# Patient Record
Sex: FEMALE | Race: WHITE | Employment: UNEMPLOYED | URBAN - METROPOLITAN AREA
[De-identification: names, ages, dates, MRNs, and addresses within clinical notes are randomized per-mention and may not be internally consistent; named-entity substitution may affect disease eponyms.]

---

## 2022-10-13 ENCOUNTER — OFFICE VISIT (OUTPATIENT)
Dept: GYNECOLOGY | Age: 52
End: 2022-10-13

## 2022-10-13 VITALS
SYSTOLIC BLOOD PRESSURE: 107 MMHG | DIASTOLIC BLOOD PRESSURE: 70 MMHG | WEIGHT: 258 LBS | BODY MASS INDEX: 41.46 KG/M2 | HEART RATE: 79 BPM | HEIGHT: 66 IN

## 2022-10-13 DIAGNOSIS — C54.1 ENDOMETRIAL CANCER (HCC): Primary | ICD-10-CM

## 2022-10-13 PROCEDURE — 99204 OFFICE O/P NEW MOD 45 MIN: CPT | Performed by: OBSTETRICS & GYNECOLOGY

## 2022-10-13 RX ORDER — METOPROLOL TARTRATE 100 MG/1
TABLET ORAL 2 TIMES DAILY
COMMUNITY
Start: 2022-10-12

## 2022-10-13 RX ORDER — ASPIRIN 81 MG/1
81 TABLET ORAL DAILY
COMMUNITY
End: 2022-10-27

## 2022-10-13 RX ORDER — ALBUTEROL SULFATE 0.83 MG/ML
SOLUTION RESPIRATORY (INHALATION)
COMMUNITY

## 2022-10-13 RX ORDER — ACETAMINOPHEN 500 MG
TABLET ORAL
COMMUNITY

## 2022-10-13 RX ORDER — OMEPRAZOLE 40 MG/1
CAPSULE, DELAYED RELEASE ORAL
COMMUNITY
Start: 2022-08-02

## 2022-10-13 RX ORDER — ALBUTEROL SULFATE 90 UG/1
AEROSOL, METERED RESPIRATORY (INHALATION)
COMMUNITY

## 2022-10-13 RX ORDER — ERGOCALCIFEROL 1.25 MG/1
CAPSULE ORAL
COMMUNITY
End: 2022-10-13

## 2022-10-13 NOTE — LETTER
10/13/2022 3:13 PM    Patient:  Jenny Xie   YOB: 1970  Date of Visit: 10/13/2022      Dear Flower Salinas, MD  [address] [facility] [address] [address]  Via Fax: 81 [facility], NP  [address] [facility]  [address]  [address] [address]  Via Fax: [phone]: Thank you for referring Ms. Jenny Xie to me for evaluation/treatment. Below are the relevant portions of my assessment and plan of care. New patient referred by Korina Sanchez for endometrial cancer. [address] [facility], [address]  P [phone]  F [phone]    Office Note  Patient ID:  Name:  Jenny Xie  MRN:  [id number]  :  1970/52 y.o. Date:  10/13/2022      HISTORY OF PRESENT ILLNESS:  Jenny Xie is a 45 y.o. morbidly obese  postmenopausal female who is a new patient being seen for endometrial cancer. She is referred by Dr. Flower Salinas with [facility] She had presented for evaluation of postmenopausal bleeding. A pelvic ultrasound revealed a thickened endometrium. Dr. Josesito Chung performed a hysteroscopy/D&C. Pathologic Diagnosis     A. Endocervix, curettings:                             Endocervical glandular mucosa with focal acute inflammation and metaplastic changes. Limited squamous ectocervix is noted with mild acute inflammation. B.          Endometrium, curettings:                             Endometrial endometrioid adenocarcinoma, FIGO grade 1 of 3, with mucinous features (see note). Note:    The case was further reviewed at [facility], please see their report and comment. Immunostains performed at [facility] show the tumor to be positive for estrogen receptors, positive for progesterone receptors: patchy staining with mCEA, positivity for vimentin and patchy/heterogeneous, non-block staining with p16.        Based upon this pathology, I have been asked to see her in consultation for further evaluation and management. ROS:   and GI review:  Negative  Cardiopulmonary review:  Negative   Musculoskeletal:  Negative    A comprehensive review of systems was negative except for that written in the History of Present Illness. , 10 point ROS      OB/GYN ROS/HX:  [id number]      Problem List:  There are no problems to display for this patient. PMH:  Past Medical History:   Diagnosis Date    Asthma     Hypertension       PSH:  Past Surgical History:   Procedure Laterality Date    HX BREAST LUMPECTOMY      HX DILATION AND CURETTAGE  09/26/2022    HX ORTHOPAEDIC      tumor in finger      Social History:  Social History     Tobacco Use    Smoking status: Every Day     Types: Cigarettes    Smokeless tobacco: Never   Substance Use Topics    Alcohol use: Not on file      Family History:  Family History   Problem Relation Age of Onset    Breast Cancer Mother     Lung Cancer Paternal Grandfather       Medications: (reviewed)  Current Outpatient Medications   Medication Sig    fluticasone propionate (FLONASE ALLERGY RELIEF NA) Flonase Allergy Relief    LORATADINE, BULK, loratadine (bulk)    cholecalciferol, vitamin D3, (VITAMIN D3 PO) Vitamin D    acetaminophen (TYLENOL) 500 mg tablet Take  by mouth every six (6) hours as needed.  albuterol (PROVENTIL VENTOLIN) 2.5 mg /3 mL (0.083 %) nebu albuterol sulfate 2.5 mg/3 mL (0.083 %) solution for nebulization   TAKE 3 ML AS NEEDED INHALATION EVERY 4 TO 6 HOURS 30 DAYS    albuterol (PROVENTIL HFA, VENTOLIN HFA, PROAIR HFA) 90 mcg/actuation inhaler albuterol sulfate HFA 90 mcg/actuation aerosol inhaler    aspirin delayed-release 81 mg tablet Take 81 mg by mouth daily.  omeprazole (PRILOSEC) 40 mg capsule TAKE 1 CAPSULE BY MOUTH ONCE A DAY 1/2 HOUR BEFORE BREAKFAST    metoprolol tartrate (LOPRESSOR) 100 mg IR tablet      No current facility-administered medications for this visit. Allergies: (reviewed)  Allergies   Allergen Reactions    Food [Shellfish Derived] Anaphylaxis    Doxycycline Hives and Nausea Only    Iodine Vertigo and Unknown (comments)    Mushroom Hives    Prednisone Vertigo    Penicillins Other (comments)          OBJECTIVE:    Physical Exam:  VITAL SIGNS: Vitals:    10/13/22 1437   BP: 107/70   Pulse: 79   Weight: 258 lb (117 kg)   Height: 5' 6\" (1.676 m)     Body mass index is 41.64 kg/m². GENERAL RODRIGO: Conversant, alert, oriented. No acute distress. HEENT: HEENT. No thyroid enlargement. No JVD. Neck: Supple without restrictions. RESPIRATORY: Clear to auscultation and percussion to the bases. No CVAT. CARDIOVASC: RRR without murmur/rub. GASTROINT: soft, non-tender, without masses or organomegaly   MUSCULOSKEL: no joint tenderness, deformity or swelling   EXTREMITIES: extremities normal, atraumatic, no cyanosis or edema   PELVIC: Vulva and vagina appear normal. Bimanual exam reveals normal uterus and adnexa. RECTAL: Deferred   NAVID SURVEY: No suspicious lymphadenopathy or edema noted. NEURO: Grossly intact. No acute deficit. Lab Data:    No results found for: WBC, HGB, HCT, PLT, MCV, HGBEXT, HCTEXT, PLTEXT, HGBEXT, HCTEXT, PLTEXT  No results found for: NA, K, CL, CO2, AGAP, GLU, BUN, CREA, BUCR, GFRAA, GFRNA, CA      CT of abdomen/pelvis (4/20/22) - [facility], [facility]  Heart size is normal. No pericardial effusion or pleural effusion. Visualized lung bases are clear. There is a 6 mm soft tissue nodule seen within the distal esophagus which could represent a polyp, prominent gastric folds from a small hiatal hernia or debris. The stomach and duodenum are grossly unremarkable. Given the reduced sensitivity of noncontrast images for abdominopelvic pathology, the liver, gallbladder, right adrenal gland, spleen, and pancreas are unremarkable. In the left adrenal gland, there is a 11 mm nodule measuring -8 Hounsfield is consistent with an adenoma. No hydronephrosis. No renal calculi. No ureteral or bladder calculus. Uterus and adnexa are unremarkable. Colonic diverticulosis. No evidence of bowel obstruction or inflammation. Appendix is visualized and is normal. No abdominal or pelvic lymphadenopathy. Atherosclerotic abdominal aorta which is normal in caliber. Bone windows demonstrate no focally destructive osseous lesions. IMPRESSION:   1. No hydronephrosis. No ureteral or bladder calculus. 2. A 6 mm soft tissue nodule within the distal esophagus could represent polyp, a gastric fold from small hiatal hernia or debris. This could be further evaluated with endoscopy. 3. There is an 11 mm left adrenal adenoma. Pelvic ultrasound (4/20/22) [facility]  UTERUS:  Size (cm): Length: 7.3  AP: 3.8 Width: 4.6   Myometrial Echo Pattern: In the posterior aspect of the uterus, there is a 25 x 17 x 20 mm hypoechoic lesion statistically likely uterine fibroid. Contour: Normal.     ENDOMETRIAL ECHO COMPLEX: The endometrial cavity is normally collapsed with no fluid distention. Width: 6.4 mm. RIGHT OVARY:   Size (cm): Length: 3.1  AP: 1.7  Width: 1.7 Volume: 5.1 mL. There is a 4 mm rounded hyperechoic focus which could represent a small dermoid cyst.     LEFT OVARY: Left ovary not visualized, either obscured by overlying viscera or surgically absent (to be correlated with surgical history). OVARIAN DOPPLER: Blood flow is demonstrated to right ovary. ADNEXAL MASSES: None seen. CUL-DE-SAC: No free fluid present. IMPRESSION:   1. There is a 25 mm hypoechoic lesion within the posterior fundus statistically likely small fibroid.  There is a 4 mm hyperechoic focus within the right ovary which could represent a tiny dermoid cyst.       IMPRESSION/PLAN:  Shawn Guerra is a 45 y.o. female with a diagnosis of grade 1 endometrial carcinoma, clinically stage I.  I reviewed with Shawn Guerra her medical records, physical exam, and review of symptoms. I discussed with her the standard of care for managing endometrial cancer, which would consist of total hysterectomy with pelvic lymph node evaluation. I recommended a robotic approach with sentinel pelvic lymph node dissection. She was counseled on the risks, benefits, indications and alternatives of the planned procedure. We discussed the possible surgical risks of bleeding, infection, potential injury to other organs/structures, and the risks of anesthesia. Her questions were answered to her satisfaction and she wishes to proceed as planned. I tried to reassure her that grade 1 endometrial cancers are generally cured with surgery and that additional therapy is not needed. An electronic signature was used to sign this note. Isabelle Mckeon MD  10/13/22               If you have questions, please do not hesitate to call me. I look forward to following Ms. Agudelo along with you.         Sincerely,      Isabelle Mckeon MD

## 2022-10-13 NOTE — PROGRESS NOTES
[address] [facility] [address] [facility] [phone]  F [phone]    Office Note  Patient ID:  Name:  Jess Silverio  MRN:  [id number]  :  1970/52 y.o. Date:  10/13/2022      HISTORY OF PRESENT ILLNESS:  Jess Silverio is a 45 y.o. morbidly obese  postmenopausal female who is a new patient being seen for endometrial cancer. She is referred by Dr. Geovanni Jack with [facility] in [facility]. She had presented for evaluation of postmenopausal bleeding. A pelvic ultrasound revealed a thickened endometrium. Dr. Naa Carreno performed a hysteroscopy/D&C. Pathologic Diagnosis     A. Endocervix, curettings:                             Endocervical glandular mucosa with focal acute inflammation and metaplastic changes. Limited squamous ectocervix is noted with mild acute inflammation. B.          Endometrium, curettings:                             Endometrial endometrioid adenocarcinoma, FIGO grade 1 of 3, with mucinous features (see note). Note:    The case was further reviewed at [facility], please see their report and comment. Immunostains performed at [facility] show the tumor to be positive for estrogen receptors, positive for progesterone receptors: patchy staining with mCEA, positivity for vimentin and patchy/heterogeneous, non-block staining with p16. Based upon this pathology, I have been asked to see her in consultation for further evaluation and management. ROS:   and GI review:  Negative  Cardiopulmonary review:  Negative   Musculoskeletal:  Negative    A comprehensive review of systems was negative except for that written in the History of Present Illness. , 10 point ROS      OB/GYN ROS/HX:  [id number]      Problem List:  There are no problems to display for this patient.     PMH:  Past Medical History:   Diagnosis Date    Asthma     Hypertension       PSH:  Past Surgical History:   Procedure Laterality Date    HX BREAST LUMPECTOMY      HX DILATION AND CURETTAGE  09/26/2022    HX ORTHOPAEDIC      tumor in finger      Social History:  Social History     Tobacco Use    Smoking status: Every Day     Types: Cigarettes    Smokeless tobacco: Never   Substance Use Topics    Alcohol use: Not on file      Family History:  Family History   Problem Relation Age of Onset    Breast Cancer Mother     Lung Cancer Paternal Grandfather       Medications: (reviewed)  Current Outpatient Medications   Medication Sig    fluticasone propionate (FLONASE ALLERGY RELIEF NA) Flonase Allergy Relief    LORATADINE, BULK, loratadine (bulk)    cholecalciferol, vitamin D3, (VITAMIN D3 PO) Vitamin D    acetaminophen (TYLENOL) 500 mg tablet Take  by mouth every six (6) hours as needed. albuterol (PROVENTIL VENTOLIN) 2.5 mg /3 mL (0.083 %) nebu albuterol sulfate 2.5 mg/3 mL (0.083 %) solution for nebulization   TAKE 3 ML AS NEEDED INHALATION EVERY 4 TO 6 HOURS 30 DAYS    albuterol (PROVENTIL HFA, VENTOLIN HFA, PROAIR HFA) 90 mcg/actuation inhaler albuterol sulfate HFA 90 mcg/actuation aerosol inhaler    aspirin delayed-release 81 mg tablet Take 81 mg by mouth daily. omeprazole (PRILOSEC) 40 mg capsule TAKE 1 CAPSULE BY MOUTH ONCE A DAY 1/2 HOUR BEFORE BREAKFAST    metoprolol tartrate (LOPRESSOR) 100 mg IR tablet      No current facility-administered medications for this visit. Allergies: (reviewed)  Allergies   Allergen Reactions    Food [Shellfish Derived] Anaphylaxis    Doxycycline Hives and Nausea Only    Iodine Vertigo and Unknown (comments)    Mushroom Hives    Prednisone Vertigo    Penicillins Other (comments)          OBJECTIVE:    Physical Exam:  VITAL SIGNS: Vitals:    10/13/22 1437   BP: 107/70   Pulse: 79   Weight: 258 lb (117 kg)   Height: 5' 6\" (1.676 m)     Body mass index is 41.64 kg/m². GENERAL RODRIGO: Conversant, alert, oriented. No acute distress. HEENT: HEENT. No thyroid enlargement.  No JVD.   Neck: Supple without restrictions. RESPIRATORY: Clear to auscultation and percussion to the bases. No CVAT. CARDIOVASC: RRR without murmur/rub. GASTROINT: soft, non-tender, without masses or organomegaly   MUSCULOSKEL: no joint tenderness, deformity or swelling   EXTREMITIES: extremities normal, atraumatic, no cyanosis or edema   PELVIC: Vulva and vagina appear normal. Bimanual exam reveals normal uterus and adnexa. RECTAL: Deferred   NAVID SURVEY: No suspicious lymphadenopathy or edema noted. NEURO: Grossly intact. No acute deficit. Lab Data:    No results found for: WBC, HGB, HCT, PLT, MCV, HGBEXT, HCTEXT, PLTEXT, HGBEXT, HCTEXT, PLTEXT  No results found for: NA, K, CL, CO2, AGAP, GLU, BUN, CREA, BUCR, GFRAA, GFRNA, CA      CT of abdomen/pelvis (4/20/22) - [facility], [facility]  Heart size is normal. No pericardial effusion or pleural effusion. Visualized lung bases are clear. There is a 6 mm soft tissue nodule seen within the distal esophagus which could represent a polyp, prominent gastric folds from a small hiatal hernia or debris. The stomach and duodenum are grossly unremarkable. Given the reduced sensitivity of noncontrast images for abdominopelvic pathology, the liver, gallbladder, right adrenal gland, spleen, and pancreas are unremarkable. In the left adrenal gland, there is a 11 mm nodule measuring -8 Hounsfield is consistent with an adenoma. No hydronephrosis. No renal calculi. No ureteral or bladder calculus. Uterus and adnexa are unremarkable. Colonic diverticulosis. No evidence of bowel obstruction or inflammation. Appendix is visualized and is normal. No abdominal or pelvic lymphadenopathy. Atherosclerotic abdominal aorta which is normal in caliber. Bone windows demonstrate no focally destructive osseous lesions. IMPRESSION:   1. No hydronephrosis. No ureteral or bladder calculus.      2. A 6 mm soft tissue nodule within the distal esophagus could represent polyp, a gastric fold from small hiatal hernia or debris. This could be further evaluated with endoscopy. 3. There is an 11 mm left adrenal adenoma. Pelvic ultrasound (4/20/22) [facility]  UTERUS:  Size (cm): Length: 7.3  AP: 3.8 Width: 4.6   Myometrial Echo Pattern: In the posterior aspect of the uterus, there is a 25 x 17 x 20 mm hypoechoic lesion statistically likely uterine fibroid. Contour: Normal.     ENDOMETRIAL ECHO COMPLEX: The endometrial cavity is normally collapsed with no fluid distention. Width: 6.4 mm. RIGHT OVARY:   Size (cm): Length: 3.1  AP: 1.7  Width: 1.7 Volume: 5.1 mL. There is a 4 mm rounded hyperechoic focus which could represent a small dermoid cyst.     LEFT OVARY: Left ovary not visualized, either obscured by overlying viscera or surgically absent (to be correlated with surgical history). OVARIAN DOPPLER: Blood flow is demonstrated to right ovary. ADNEXAL MASSES: None seen. CUL-DE-SAC: No free fluid present. IMPRESSION:   1. There is a 25 mm hypoechoic lesion within the posterior fundus statistically likely small fibroid. There is a 4 mm hyperechoic focus within the right ovary which could represent a tiny dermoid cyst.       IMPRESSION/PLAN:  Ella Brown is a 45 y.o. female with a diagnosis of grade 1 endometrial carcinoma, clinically stage I.  I reviewed with Ella Brown her medical records, physical exam, and review of symptoms. I discussed with her the standard of care for managing endometrial cancer, which would consist of total hysterectomy with pelvic lymph node evaluation. I recommended a robotic approach with sentinel pelvic lymph node dissection. She was counseled on the risks, benefits, indications and alternatives of the planned procedure. We discussed the possible surgical risks of bleeding, infection, potential injury to other organs/structures, and the risks of anesthesia.   Her questions were answered to her satisfaction and she wishes to proceed as planned. I tried to reassure her that grade 1 endometrial cancers are generally cured with surgery and that additional therapy is not needed. An electronic signature was used to sign this note.     Jhony Lopez MD  10/13/22

## 2022-10-27 ENCOUNTER — HOSPITAL ENCOUNTER (OUTPATIENT)
Dept: PREADMISSION TESTING | Age: 52
Discharge: HOME | End: 2022-10-27
Payer: COMMERCIAL

## 2022-10-27 VITALS
HEIGHT: 64 IN | DIASTOLIC BLOOD PRESSURE: 75 MMHG | SYSTOLIC BLOOD PRESSURE: 126 MMHG | TEMPERATURE: 98 F | WEIGHT: 259 LBS | BODY MASS INDEX: 44.22 KG/M2 | HEART RATE: 70 BPM

## 2022-10-27 LAB
ALBUMIN SERPL-MCNC: 3.3 G/DL (ref 3.5–5)
ALBUMIN/GLOB SERPL: 1.2 {RATIO} (ref 1.1–2.2)
ALP SERPL-CCNC: 89 U/L (ref 45–117)
ALT SERPL-CCNC: 18 U/L (ref 12–78)
ANION GAP SERPL CALC-SCNC: 3 MMOL/L (ref 5–15)
APPEARANCE UR: CLEAR
AST SERPL-CCNC: 11 U/L (ref 15–37)
ATRIAL RATE: 60 BPM
BACTERIA URNS QL MICRO: NEGATIVE /HPF
BASOPHILS # BLD: 0.1 K/UL (ref 0–0.1)
BASOPHILS NFR BLD: 1 % (ref 0–1)
BILIRUB SERPL-MCNC: 0.4 MG/DL (ref 0.2–1)
BILIRUB UR QL: NEGATIVE
BUN SERPL-MCNC: 17 MG/DL (ref 6–20)
BUN/CREAT SERPL: 17 (ref 12–20)
CALCIUM SERPL-MCNC: 9 MG/DL (ref 8.5–10.1)
CALCULATED P AXIS, ECG09: 25 DEGREES
CALCULATED R AXIS, ECG10: 61 DEGREES
CALCULATED T AXIS, ECG11: 47 DEGREES
CHLORIDE SERPL-SCNC: 108 MMOL/L (ref 97–108)
CO2 SERPL-SCNC: 31 MMOL/L (ref 21–32)
COLOR UR: ABNORMAL
CREAT SERPL-MCNC: 1 MG/DL (ref 0.55–1.02)
DIAGNOSIS, 93000: NORMAL
DIFFERENTIAL METHOD BLD: ABNORMAL
EOSINOPHIL # BLD: 0.4 K/UL (ref 0–0.4)
EOSINOPHIL NFR BLD: 6 % (ref 0–7)
EPITH CASTS URNS QL MICRO: ABNORMAL /LPF
ERYTHROCYTE [DISTWIDTH] IN BLOOD BY AUTOMATED COUNT: 13.3 % (ref 11.5–14.5)
GLOBULIN SER CALC-MCNC: 2.8 G/DL (ref 2–4)
GLUCOSE SERPL-MCNC: 125 MG/DL (ref 65–100)
GLUCOSE UR STRIP.AUTO-MCNC: NEGATIVE MG/DL
HCT VFR BLD AUTO: 47.9 % (ref 35–47)
HGB BLD-MCNC: 15.2 G/DL (ref 11.5–16)
HGB UR QL STRIP: NEGATIVE
HYALINE CASTS URNS QL MICRO: ABNORMAL /LPF (ref 0–5)
IMM GRANULOCYTES # BLD AUTO: 0 K/UL (ref 0–0.04)
IMM GRANULOCYTES NFR BLD AUTO: 0 % (ref 0–0.5)
KETONES UR QL STRIP.AUTO: ABNORMAL MG/DL
LEUKOCYTE ESTERASE UR QL STRIP.AUTO: NEGATIVE
LYMPHOCYTES # BLD: 2.4 K/UL (ref 0.8–3.5)
LYMPHOCYTES NFR BLD: 37 % (ref 12–49)
MCH RBC QN AUTO: 29.2 PG (ref 26–34)
MCHC RBC AUTO-ENTMCNC: 31.7 G/DL (ref 30–36.5)
MCV RBC AUTO: 91.9 FL (ref 80–99)
MONOCYTES # BLD: 0.5 K/UL (ref 0–1)
MONOCYTES NFR BLD: 7 % (ref 5–13)
NEUTS SEG # BLD: 3.2 K/UL (ref 1.8–8)
NEUTS SEG NFR BLD: 49 % (ref 32–75)
NITRITE UR QL STRIP.AUTO: NEGATIVE
NRBC # BLD: 0 K/UL (ref 0–0.01)
NRBC BLD-RTO: 0 PER 100 WBC
P-R INTERVAL, ECG05: 156 MS
PH UR STRIP: 5.5 [PH] (ref 5–8)
PLATELET # BLD AUTO: 189 K/UL (ref 150–400)
PMV BLD AUTO: 10.5 FL (ref 8.9–12.9)
POTASSIUM SERPL-SCNC: 4.7 MMOL/L (ref 3.5–5.1)
PROT SERPL-MCNC: 6.1 G/DL (ref 6.4–8.2)
PROT UR STRIP-MCNC: ABNORMAL MG/DL
Q-T INTERVAL, ECG07: 422 MS
QRS DURATION, ECG06: 86 MS
QTC CALCULATION (BEZET), ECG08: 422 MS
RBC # BLD AUTO: 5.21 M/UL (ref 3.8–5.2)
RBC #/AREA URNS HPF: ABNORMAL /HPF (ref 0–5)
SODIUM SERPL-SCNC: 142 MMOL/L (ref 136–145)
SP GR UR REFRACTOMETRY: 1.03
UA: UC IF INDICATED,UAUC: ABNORMAL
UROBILINOGEN UR QL STRIP.AUTO: 0.2 EU/DL (ref 0.2–1)
VENTRICULAR RATE, ECG03: 60 BPM
WBC # BLD AUTO: 6.5 K/UL (ref 3.6–11)
WBC URNS QL MICRO: ABNORMAL /HPF (ref 0–4)

## 2022-10-27 PROCEDURE — 80053 COMPREHEN METABOLIC PANEL: CPT

## 2022-10-27 PROCEDURE — 81001 URINALYSIS AUTO W/SCOPE: CPT

## 2022-10-27 PROCEDURE — 36415 COLL VENOUS BLD VENIPUNCTURE: CPT

## 2022-10-27 PROCEDURE — 85025 COMPLETE CBC W/AUTO DIFF WBC: CPT

## 2022-10-27 PROCEDURE — 93005 ELECTROCARDIOGRAM TRACING: CPT

## 2022-10-27 NOTE — PERIOP NOTES
Ebony Agudelo(1970) was seen at [address] [place] [address] on 10/27/22. They have surgery scheduled with Dr. Nikos Roach on 11/2/22. The results of their JASON STOP-BANG Scoring Tool indicates the potential need for a referral to sleep medicine. Results forwarded to PCP for follow-up/further recommendations regarding evaluation for sleep apnea. JASON STOP-BANG Scoring Tool    [] Does the patient snore loud enough to be heard through a closed door? [x] Does the patient often feel tired, fatigued or sleep during the daytime or after a \"good\" night's sleep? [x] Has anyone observed the patient stop breathing during their sleep? [x] Does the patient have or are they treated for HTN? [x] Is the patient's BMI >35? [] Is the patient's neck size >17\"(male) or >16\"(female)? [x] Is the patient older than 48?  [] Is the patient male?     JASON Risk Score: [address], [place]

## 2022-10-27 NOTE — PERIOP NOTES
[address] INSTRUCTIONS    Surgery Date:   11/2/22    Your surgeon's office or [facility] staff will call you between 4 PM- 8 PM the day before surgery with your arrival time. If your surgery is on a Monday, you will receive a call the preceding Friday. Please report to [facility] Patient Access/Admitting on the 1st floor. Bring your insurance card, photo identification, and any copayment ( if applicable). If you are going home the same day of your surgery, you must have a responsible adult to drive you home. You need to have a responsible adult to stay with you the first 24 hours after surgery and you should not drive a car for 24 hours following your surgery. Nothing to eat or drink after midnight the night before surgery. This includes no water, gum, mints, coffee, juice, etc.  Please note special instructions, if applicable, below for medications. Do NOT drink alcohol or smoke 24 hours before surgery. STOP smoking for 14 days prior as it helps with breathing and healing after surgery. If you are being admitted to the hospital, please leave personal belongings/luggage in your car until you have an assigned hospital room number. Please wear comfortable clothes. Wear your glasses instead of contacts. We ask that all money, jewelry and valuables be left at home. Wear no make up, particularly mascara, the day of surgery. All body piercings, rings, and jewelry need to be removed and left at home. Please remove any nail polish or artificial nails from your fingernails. Please wear your hair loose or down. Please no pony-tails, buns, or any metal hair accessories. If you shower the morning of surgery, please do not apply any lotions or powders afterwards. You may wear deodorant, unless having breast surgery. Do not shave any body area within 24 hours of your surgery. Please follow all instructions to avoid any potential surgical cancellation.   Should your physical condition change, (i.e. fever, cold, flu, etc.) please notify your surgeon as soon as possible. It is important to be on time. If a situation occurs where you may be delayed, please call:  [phone] / [phone] on the day of surgery. The Preadmission Testing staff can be reached at [phone]. Special instructions: BRING THROAT SPRAY, INHALER, FLONASE      Current Outpatient Medications   Medication Sig    phenol throat spray (Chloraseptic Throat Spray) 1.4 % spray Take 1 Spray by mouth as needed for Sore throat. fluticasone propionate (FLONASE ALLERGY RELIEF NA) Flonase Allergy Relief    LORATADINE, BULK, loratadine (bulk)    acetaminophen (TYLENOL) 500 mg tablet Take  by mouth every six (6) hours as needed. albuterol (PROVENTIL VENTOLIN) 2.5 mg /3 mL (0.083 %) nebu albuterol sulfate 2.5 mg/3 mL (0.083 %) solution for nebulization   TAKE 3 ML AS NEEDED INHALATION EVERY 4 TO 6 HOURS 30 DAYS    albuterol (PROVENTIL HFA, VENTOLIN HFA, PROAIR HFA) 90 mcg/actuation inhaler albuterol sulfate HFA 90 mcg/actuation aerosol inhaler    omeprazole (PRILOSEC) 40 mg capsule TAKE 1 CAPSULE BY MOUTH ONCE A DAY 1/2 HOUR BEFORE BREAKFAST    metoprolol tartrate (LOPRESSOR) 100 mg IR tablet two (2) times a day. No current facility-administered medications for this encounter. YOU MUST ONLY TAKE THESE MEDICATIONS THE MORNING OF SURGERY WITH A SIP OF WATER: THROAT SPRAY, USE FLONASE SPRAY, OMEPRAZOLE    MEDICATIONS TO TAKE THE MORNING OF SURGERY ONLY IF NEEDED: ALBUTEROL NEB, TYLENOL    HOLD these prescription medications BEFORE Surgery: NONE    Ask your surgeon/prescribing physician about when/if to STOP taking these medications: NONE    Stop all vitamins, herbal medicines and Aspirin containing products 7 days prior to surgery. Stop any non-steroidal anti-inflammatory drugs (i.e. Ibuprofen, Naproxen, Advil, Aleve) 3 days before surgery. You may take Tylenol.       If you are currently taking Plavix, Coumadin, or any other blood-thinning/anticoagulant medication contact your prescribing physician for instructions. Preventing Infections Before and After - Your Surgery    IMPORTANT INSTRUCTIONS      You play an important role in your health and preparation for surgery. To reduce the germs on your skin you will need to shower with CHG soap (Chorhexidine gluconate 4%) two times before surgery. CHG soap (Hibiclens, Hex-A-Clens or store brand)  CHG soap will be provided at your Preadmission Testing (PAT) appointment. If you do not have a PAT appointment before surgery, you may arrange to  CHG soap from our office or purchase CHG soap at a pharmacy, grocery or department store. You need to purchase TWO 4 ounce bottles to use for your 2 showers. Steps to follow:  Kaushal Dasher your hair with your normal shampoo and your body with regular soap and rinse well to remove shampoo and soap from your skin. Wet a clean washcloth and turn off the shower. Put CHG soap on washcloth and apply to your entire body from the neck down. Do not use on your head, face or private parts(genitals). Do not use CHG soap on open sores, wounds or areas of skin irritation. Wash you body gently for 5 minutes. Do not wash your skin too hard. This soap does not create lather. Pay special attention to your underarms and from your belly button to your feet. Turn the shower back on and rinse well to get CHG soap off your body. Pat your skin dry with a clean, dry towel. Do not apply lotions or moisturizer. Put on clean clothes and sleep on fresh bed sheets and do not allow pets to sleep with you. Shower with CHG soap 2 times before your surgery  The evening before your surgery  The morning of your surgery      Tips to help prevent infections after your surgery:  Protect your surgical wound from germs:  Hand washing is the most important thing you and your caregivers can do to prevent infections. Keep your bandage clean and dry!   Do not touch your surgical wound. Use clean, freshly washed towels and washcloths every time you shower; do not share bath linens with others. Until your surgical wound is healed, wear clothing and sleep on bed linens each day that are clean and freshly washed. Do not allow pets to sleep in your bed with you or touch your surgical wound. Do not smoke - smoking delays wound healing. This may be a good time to stop smoking. If you have diabetes, it is important for you to manage your blood sugar levels properly before your surgery as well as after your surgery. Poorly managed blood sugar levels slow down wound healing and prevent you from healing completely. Patient Information Regarding COVID Restrictions    Day of Procedure    Please park in the parking deck or any designated visitor parking lot. Enter the facility through the Main Entrance of the hospital.  On the day of surgery, please provide the cell phone number of the person who will be waiting for you to the Patient Access representative at the time of registration. Masks are highly recommended in the hospital, but not required. Once your procedure and the immediate recovery period is completed, a nurse in the recovery area will contact your designated visitor to inform them of your room number or to otherwise review other pertinent information regarding your care. Social distancing practices are strongly encouraged in waiting areas and the cafeteria. The patient was contacted  in person. She verbalized understanding of all instructions does not  need reinforcement.

## 2022-11-01 ENCOUNTER — ANESTHESIA EVENT (OUTPATIENT)
Dept: SURGERY | Age: 52
End: 2022-11-01
Payer: COMMERCIAL

## 2022-11-01 NOTE — H&P
[address] [facility] [address], [address] [facility]  P [phone]  F [phone]        Patient ID:  Name:  Susan Le  MRN:  [id number]  :  1970/52 y.o. Date:  2022      HISTORY OF PRESENT ILLNESS:  Susan Le is a 45 y.o. morbidly obese  postmenopausal female who is a new patient being seen for endometrial cancer. She is referred by Dr. Lolly Montiel with [facility] in [facility]. She had presented for evaluation of postmenopausal bleeding. A pelvic ultrasound revealed a thickened endometrium. Dr. Juliocesar Murphy performed a hysteroscopy/D&C. Pathologic Diagnosis     A. Endocervix, curettings:                             Endocervical glandular mucosa with focal acute inflammation and metaplastic changes. Limited squamous ectocervix is noted with mild acute inflammation. B.          Endometrium, curettings:                             Endometrial endometrioid adenocarcinoma, FIGO grade 1 of 3, with mucinous features (see note). Note:    The case was further reviewed at [facility], please see their report and comment. Immunostains performed at [facility] show the tumor to be positive for estrogen receptors, positive for progesterone receptors: patchy staining with mCEA, positivity for vimentin and patchy/heterogeneous, non-block staining with p16. Based upon this pathology, I have been asked to see her in consultation for further evaluation and management. ROS:   and GI review:  Negative  Cardiopulmonary review:  Negative   Musculoskeletal:  Negative    A comprehensive review of systems was negative except for that written in the History of Present Illness. , 10 point ROS      OB/GYN ROS/HX:  [id number]      Problem List:  There are no problems to display for this patient.     PMH:  Past Medical History:   Diagnosis Date    Adverse effect of anesthesia     \"HR WENT UP AND HAD TROUBLE BREATHING AFTER SURGERY\"    Arthritis     Asthma     CAD (coronary artery disease)     Cancer ([facility] [address] 2022    UTERINE    GERD (gastroesophageal reflux disease)     Hypertension       PSH:  Past Surgical History:   Procedure Laterality Date    HX BREAST LUMPECTOMY      HX COLONOSCOPY      HX DILATION AND CURETTAGE  09/26/2022    HX ENDOSCOPY      HX GYN  2022    UTERINE BIOPSY    HX HEENT      TEETH PULLED UNDER ANESTHESIA    HX ORTHOPAEDIC      tumor in finger      Social History:  Social History     Tobacco Use    Smoking status: Every Day     Packs/day: 1.00     Years: 40.00     Pack years: 40.00     Types: Cigarettes    Smokeless tobacco: Never   Substance Use Topics    Alcohol use: Never      Family History:  Family History   Problem Relation Age of Onset    Lung Cancer Mother         UNSURE IF MOM HAD ANESTHIA PROBLEMS    Breast Cancer Mother     Hypertension Father     Heart Disease Father     No Known Problems Sister     Lung Cancer Paternal Grandfather       Medications: (reviewed)  No current facility-administered medications for this encounter. Current Outpatient Medications   Medication Sig    phenol throat spray (Chloraseptic Throat Spray) 1.4 % spray Take 1 Spray by mouth as needed for Sore throat. fluticasone propionate (FLONASE ALLERGY RELIEF NA) Flonase Allergy Relief    LORATADINE, BULK, loratadine (bulk)    acetaminophen (TYLENOL) 500 mg tablet Take  by mouth every six (6) hours as needed. albuterol (PROVENTIL VENTOLIN) 2.5 mg /3 mL (0.083 %) nebu albuterol sulfate 2.5 mg/3 mL (0.083 %) solution for nebulization   TAKE 3 ML AS NEEDED INHALATION EVERY 4 TO 6 HOURS 30 DAYS    albuterol (PROVENTIL HFA, VENTOLIN HFA, PROAIR HFA) 90 mcg/actuation inhaler albuterol sulfate HFA 90 mcg/actuation aerosol inhaler    omeprazole (PRILOSEC) 40 mg capsule TAKE 1 CAPSULE BY MOUTH ONCE A DAY 1/2 HOUR BEFORE BREAKFAST    metoprolol tartrate (LOPRESSOR) 100 mg IR tablet two (2) times a day.      Allergies: (reviewed)  Allergies   Allergen Reactions    Food [Shellfish Derived] Anaphylaxis    Doxycycline Hives and Nausea Only    Iodine Vertigo and Unknown (comments)    Mushroom Hives    Prednisone Vertigo    Penicillins Nausea and Vomiting     Patient screened for any delayed non-IgE-mediated reaction to PCN. Patient notes the following:    No delayed non-IgE-mediated reaction to PCN                   OBJECTIVE:    Physical Exam:  VITAL SIGNS: There were no vitals filed for this visit. There is no height or weight on file to calculate BMI. GENERAL RODRIGO: Conversant, alert, oriented. No acute distress. HEENT: HEENT. No thyroid enlargement. No JVD. Neck: Supple without restrictions. RESPIRATORY: Clear to auscultation and percussion to the bases. No CVAT. CARDIOVASC: RRR without murmur/rub. GASTROINT: soft, non-tender, without masses or organomegaly   MUSCULOSKEL: no joint tenderness, deformity or swelling   EXTREMITIES: extremities normal, atraumatic, no cyanosis or edema   PELVIC: Vulva and vagina appear normal. Bimanual exam reveals normal uterus and adnexa. RECTAL: Deferred   NAVID SURVEY: No suspicious lymphadenopathy or edema noted. NEURO: Grossly intact. No acute deficit.        Lab Data:    Lab Results   Component Value Date/Time    WBC 6.5 10/27/2022 01:59 PM    HGB 15.2 10/27/2022 01:59 PM    HCT 47.9 (H) 10/27/2022 01:59 PM    PLATELET 877 44/95/6854 01:59 PM    MCV 91.9 10/27/2022 01:59 PM     Lab Results   Component Value Date/Time    Sodium 142 10/27/2022 01:59 PM    Potassium 4.7 10/27/2022 01:59 PM    Chloride 108 10/27/2022 01:59 PM    CO2 31 10/27/2022 01:59 PM    Anion gap 3 (L) 10/27/2022 01:59 PM    Glucose 125 (H) 10/27/2022 01:59 PM    BUN 17 10/27/2022 01:59 PM    Creatinine 1.00 10/27/2022 01:59 PM    BUN/Creatinine ratio 17 10/27/2022 01:59 PM    Calcium 9.0 10/27/2022 01:59 PM         CT of abdomen/pelvis (4/20/22) - [facility], [facility]  Heart size is normal. No pericardial effusion or pleural effusion. Visualized lung bases are clear. There is a 6 mm soft tissue nodule seen within the distal esophagus which could represent a polyp, prominent gastric folds from a small hiatal hernia or debris. The stomach and duodenum are grossly unremarkable. Given the reduced sensitivity of noncontrast images for abdominopelvic pathology, the liver, gallbladder, right adrenal gland, spleen, and pancreas are unremarkable. In the left adrenal gland, there is a 11 mm nodule measuring -8 Hounsfield is consistent with an adenoma. No hydronephrosis. No renal calculi. No ureteral or bladder calculus. Uterus and adnexa are unremarkable. Colonic diverticulosis. No evidence of bowel obstruction or inflammation. Appendix is visualized and is normal. No abdominal or pelvic lymphadenopathy. Atherosclerotic abdominal aorta which is normal in caliber. Bone windows demonstrate no focally destructive osseous lesions. IMPRESSION:   1. No hydronephrosis. No ureteral or bladder calculus. 2. A 6 mm soft tissue nodule within the distal esophagus could represent polyp, a gastric fold from small hiatal hernia or debris. This could be further evaluated with endoscopy. 3. There is an 11 mm left adrenal adenoma. Pelvic ultrasound (4/20/22) [facility]  UTERUS:  Size (cm): Length: 7.3  AP: 3.8 Width: 4.6   Myometrial Echo Pattern: In the posterior aspect of the uterus, there is a 25 x 17 x 20 mm hypoechoic lesion statistically likely uterine fibroid. Contour: Normal.     ENDOMETRIAL ECHO COMPLEX: The endometrial cavity is normally collapsed with no fluid distention. Width: 6.4 mm. RIGHT OVARY:   Size (cm): Length: 3.1  AP: 1.7  Width: 1.7 Volume: 5.1 mL. There is a 4 mm rounded hyperechoic focus which could represent a small dermoid cyst.     LEFT OVARY: Left ovary not visualized, either obscured by overlying viscera or surgically absent (to be correlated with surgical history). OVARIAN DOPPLER: Blood flow is demonstrated to right ovary. ADNEXAL MASSES: None seen. CUL-DE-SAC: No free fluid present. IMPRESSION:   1. There is a 25 mm hypoechoic lesion within the posterior fundus statistically likely small fibroid. There is a 4 mm hyperechoic focus within the right ovary which could represent a tiny dermoid cyst.       IMPRESSION/PLAN:  Lui Kathleen is a 45 y.o. female with a diagnosis of grade 1 endometrial carcinoma, clinically stage I.  I reviewed with Lui Kathleen her medical records, physical exam, and review of symptoms. I discussed with her the standard of care for managing endometrial cancer, which would consist of total hysterectomy with pelvic lymph node evaluation. I recommended a robotic approach with sentinel pelvic lymph node dissection. She was counseled on the risks, benefits, indications and alternatives of the planned procedure. We discussed the possible surgical risks of bleeding, infection, potential injury to other organs/structures, and the risks of anesthesia. Her questions were answered to her satisfaction and she wishes to proceed as planned. I tried to reassure her that grade 1 endometrial cancers are generally cured with surgery and that additional therapy is not needed. An electronic signature was used to sign this note. Ruby Hernandez MD  11/01/22       Date of Surgery Update  Lui Kathleen was seen and examined. History and physical has been reviewed. The patient has been examined. There have been no significant clinical changes since the completion of the originally dated History and Physical.  Patient identified by surgeon; surgical site was confirmed by patient and surgeon. Planned procedure again discussed. Questions invited and answered. Surgical consent signed by patient. Patient wishes to proceed with planned procedure.     Ruby Hernandez MD  November 2, 2022  7:27 AM

## 2022-11-02 ENCOUNTER — HOSPITAL ENCOUNTER (OUTPATIENT)
Age: 52
Discharge: HOME | End: 2022-11-03
Attending: OBSTETRICS & GYNECOLOGY | Admitting: OBSTETRICS & GYNECOLOGY
Payer: COMMERCIAL

## 2022-11-02 ENCOUNTER — ANESTHESIA (OUTPATIENT)
Dept: SURGERY | Age: 52
End: 2022-11-02
Payer: COMMERCIAL

## 2022-11-02 DIAGNOSIS — C54.1 ENDOMETRIAL CANCER (HCC): Primary | ICD-10-CM

## 2022-11-02 DIAGNOSIS — G89.18 POST-OP PAIN: ICD-10-CM

## 2022-11-02 PROCEDURE — 74011000250 HC RX REV CODE- 250: Performed by: OBSTETRICS & GYNECOLOGY

## 2022-11-02 PROCEDURE — 88307 TISSUE EXAM BY PATHOLOGIST: CPT

## 2022-11-02 PROCEDURE — 77030018778 HC MANIP UTER VCAR CNMD -B: Performed by: OBSTETRICS & GYNECOLOGY

## 2022-11-02 PROCEDURE — 77030003666 HC NDL SPINAL BD -A: Performed by: OBSTETRICS & GYNECOLOGY

## 2022-11-02 PROCEDURE — 74011250637 HC RX REV CODE- 250/637: Performed by: OBSTETRICS & GYNECOLOGY

## 2022-11-02 PROCEDURE — 77030035029 HC NDL INSUF VERES DISP COVD -B: Performed by: OBSTETRICS & GYNECOLOGY

## 2022-11-02 PROCEDURE — 74011250636 HC RX REV CODE- 250/636: Performed by: OBSTETRICS & GYNECOLOGY

## 2022-11-02 PROCEDURE — 77030002934 HC SUT MCRYL J&J -B: Performed by: OBSTETRICS & GYNECOLOGY

## 2022-11-02 PROCEDURE — 74011000250 HC RX REV CODE- 250: Performed by: ANESTHESIOLOGY

## 2022-11-02 PROCEDURE — 77030031492 HC PRT ACC BLNT AIRSEAL CNMD -B: Performed by: OBSTETRICS & GYNECOLOGY

## 2022-11-02 PROCEDURE — 74011250636 HC RX REV CODE- 250/636: Performed by: ANESTHESIOLOGY

## 2022-11-02 PROCEDURE — 77030035277 HC OBTRTR BLDELSS DISP INTU -B: Performed by: OBSTETRICS & GYNECOLOGY

## 2022-11-02 PROCEDURE — 2709999900 HC NON-CHARGEABLE SUPPLY: Performed by: OBSTETRICS & GYNECOLOGY

## 2022-11-02 PROCEDURE — 88112 CYTOPATH CELL ENHANCE TECH: CPT

## 2022-11-02 PROCEDURE — 77030002933 HC SUT MCRYL J&J -A: Performed by: OBSTETRICS & GYNECOLOGY

## 2022-11-02 PROCEDURE — 77030039895 HC SYST SMK EVAC LAP COVD -B: Performed by: OBSTETRICS & GYNECOLOGY

## 2022-11-02 PROCEDURE — 88309 TISSUE EXAM BY PATHOLOGIST: CPT

## 2022-11-02 PROCEDURE — 76210000016 HC OR PH I REC 1 TO 1.5 HR: Performed by: OBSTETRICS & GYNECOLOGY

## 2022-11-02 PROCEDURE — 76060000035 HC ANESTHESIA 2 TO 2.5 HR: Performed by: OBSTETRICS & GYNECOLOGY

## 2022-11-02 PROCEDURE — 77030020703 HC SEAL CANN DISP INTU -B: Performed by: OBSTETRICS & GYNECOLOGY

## 2022-11-02 PROCEDURE — 76010000875 HC OR TIME 1.5 TO 2HR INTENSV - TIER 2: Performed by: OBSTETRICS & GYNECOLOGY

## 2022-11-02 RX ORDER — SODIUM CHLORIDE, SODIUM LACTATE, POTASSIUM CHLORIDE, CALCIUM CHLORIDE 600; 310; 30; 20 MG/100ML; MG/100ML; MG/100ML; MG/100ML
125 INJECTION, SOLUTION INTRAVENOUS CONTINUOUS
Status: DISCONTINUED | OUTPATIENT
Start: 2022-11-02 | End: 2022-11-02 | Stop reason: HOSPADM

## 2022-11-02 RX ORDER — SODIUM CHLORIDE 0.9 % (FLUSH) 0.9 %
5-40 SYRINGE (ML) INJECTION AS NEEDED
Status: DISCONTINUED | OUTPATIENT
Start: 2022-11-02 | End: 2022-11-02 | Stop reason: HOSPADM

## 2022-11-02 RX ORDER — MIDAZOLAM HYDROCHLORIDE 1 MG/ML
1 INJECTION, SOLUTION INTRAMUSCULAR; INTRAVENOUS AS NEEDED
Status: DISCONTINUED | OUTPATIENT
Start: 2022-11-02 | End: 2022-11-02 | Stop reason: HOSPADM

## 2022-11-02 RX ORDER — ASPIRIN 81 MG/1
TABLET ORAL DAILY
COMMUNITY

## 2022-11-02 RX ORDER — FLUTICASONE PROPIONATE 50 MCG
2 SPRAY, SUSPENSION (ML) NASAL DAILY PRN
Status: DISCONTINUED | OUTPATIENT
Start: 2022-11-02 | End: 2022-11-03 | Stop reason: HOSPADM

## 2022-11-02 RX ORDER — DEXAMETHASONE SODIUM PHOSPHATE 4 MG/ML
INJECTION, SOLUTION INTRA-ARTICULAR; INTRALESIONAL; INTRAMUSCULAR; INTRAVENOUS; SOFT TISSUE AS NEEDED
Status: DISCONTINUED | OUTPATIENT
Start: 2022-11-02 | End: 2022-11-02 | Stop reason: HOSPADM

## 2022-11-02 RX ORDER — BUPIVACAINE HYDROCHLORIDE 5 MG/ML
INJECTION, SOLUTION EPIDURAL; INTRACAUDAL AS NEEDED
Status: DISCONTINUED | OUTPATIENT
Start: 2022-11-02 | End: 2022-11-02 | Stop reason: HOSPADM

## 2022-11-02 RX ORDER — ALBUTEROL SULFATE 90 UG/1
2 AEROSOL, METERED RESPIRATORY (INHALATION)
Status: DISCONTINUED | OUTPATIENT
Start: 2022-11-02 | End: 2022-11-03 | Stop reason: HOSPADM

## 2022-11-02 RX ORDER — MORPHINE SULFATE 2 MG/ML
2 INJECTION, SOLUTION INTRAMUSCULAR; INTRAVENOUS
Status: DISCONTINUED | OUTPATIENT
Start: 2022-11-02 | End: 2022-11-02 | Stop reason: HOSPADM

## 2022-11-02 RX ORDER — MIDAZOLAM HYDROCHLORIDE 1 MG/ML
INJECTION, SOLUTION INTRAMUSCULAR; INTRAVENOUS AS NEEDED
Status: DISCONTINUED | OUTPATIENT
Start: 2022-11-02 | End: 2022-11-02 | Stop reason: HOSPADM

## 2022-11-02 RX ORDER — ROCURONIUM BROMIDE 10 MG/ML
INJECTION, SOLUTION INTRAVENOUS AS NEEDED
Status: DISCONTINUED | OUTPATIENT
Start: 2022-11-02 | End: 2022-11-02 | Stop reason: HOSPADM

## 2022-11-02 RX ORDER — FENTANYL CITRATE 50 UG/ML
50 INJECTION, SOLUTION INTRAMUSCULAR; INTRAVENOUS AS NEEDED
Status: DISCONTINUED | OUTPATIENT
Start: 2022-11-02 | End: 2022-11-02 | Stop reason: HOSPADM

## 2022-11-02 RX ORDER — DEXTROSE, SODIUM CHLORIDE, SODIUM LACTATE, POTASSIUM CHLORIDE, AND CALCIUM CHLORIDE 5; .6; .31; .03; .02 G/100ML; G/100ML; G/100ML; G/100ML; G/100ML
125 INJECTION, SOLUTION INTRAVENOUS CONTINUOUS
Status: DISCONTINUED | OUTPATIENT
Start: 2022-11-02 | End: 2022-11-02 | Stop reason: HOSPADM

## 2022-11-02 RX ORDER — MIDAZOLAM HYDROCHLORIDE 1 MG/ML
1 INJECTION, SOLUTION INTRAMUSCULAR; INTRAVENOUS
Status: DISCONTINUED | OUTPATIENT
Start: 2022-11-02 | End: 2022-11-02 | Stop reason: HOSPADM

## 2022-11-02 RX ORDER — PROCHLORPERAZINE EDISYLATE 5 MG/ML
10 INJECTION INTRAMUSCULAR; INTRAVENOUS
Status: DISCONTINUED | OUTPATIENT
Start: 2022-11-02 | End: 2022-11-03 | Stop reason: HOSPADM

## 2022-11-02 RX ORDER — ROPIVACAINE HYDROCHLORIDE 5 MG/ML
30 INJECTION, SOLUTION EPIDURAL; INFILTRATION; PERINEURAL AS NEEDED
Status: DISCONTINUED | OUTPATIENT
Start: 2022-11-02 | End: 2022-11-02 | Stop reason: HOSPADM

## 2022-11-02 RX ORDER — LIDOCAINE HYDROCHLORIDE 10 MG/ML
0.5 INJECTION, SOLUTION EPIDURAL; INFILTRATION; INTRACAUDAL; PERINEURAL AS NEEDED
Status: DISCONTINUED | OUTPATIENT
Start: 2022-11-02 | End: 2022-11-02 | Stop reason: HOSPADM

## 2022-11-02 RX ORDER — ALBUTEROL SULFATE 0.83 MG/ML
2.5 SOLUTION RESPIRATORY (INHALATION)
Status: DISCONTINUED | OUTPATIENT
Start: 2022-11-02 | End: 2022-11-03 | Stop reason: HOSPADM

## 2022-11-02 RX ORDER — OXYCODONE HYDROCHLORIDE 5 MG/1
5 TABLET ORAL AS NEEDED
Status: DISCONTINUED | OUTPATIENT
Start: 2022-11-02 | End: 2022-11-02 | Stop reason: HOSPADM

## 2022-11-02 RX ORDER — ACETAMINOPHEN 325 MG/1
650 TABLET ORAL EVERY 6 HOURS
Status: DISCONTINUED | OUTPATIENT
Start: 2022-11-02 | End: 2022-11-03 | Stop reason: HOSPADM

## 2022-11-02 RX ORDER — PANTOPRAZOLE SODIUM 40 MG/1
40 TABLET, DELAYED RELEASE ORAL
Status: DISCONTINUED | OUTPATIENT
Start: 2022-11-03 | End: 2022-11-03 | Stop reason: HOSPADM

## 2022-11-02 RX ORDER — DIPHENHYDRAMINE HYDROCHLORIDE 50 MG/ML
12.5 INJECTION, SOLUTION INTRAMUSCULAR; INTRAVENOUS AS NEEDED
Status: DISCONTINUED | OUTPATIENT
Start: 2022-11-02 | End: 2022-11-02 | Stop reason: HOSPADM

## 2022-11-02 RX ORDER — HYDROMORPHONE HYDROCHLORIDE 1 MG/ML
1 INJECTION, SOLUTION INTRAMUSCULAR; INTRAVENOUS; SUBCUTANEOUS
Status: DISCONTINUED | OUTPATIENT
Start: 2022-11-02 | End: 2022-11-03 | Stop reason: HOSPADM

## 2022-11-02 RX ORDER — GLYCOPYRROLATE 0.2 MG/ML
INJECTION INTRAMUSCULAR; INTRAVENOUS AS NEEDED
Status: DISCONTINUED | OUTPATIENT
Start: 2022-11-02 | End: 2022-11-02 | Stop reason: HOSPADM

## 2022-11-02 RX ORDER — METOPROLOL TARTRATE 50 MG/1
100 TABLET ORAL 2 TIMES DAILY
Status: DISCONTINUED | OUTPATIENT
Start: 2022-11-02 | End: 2022-11-03 | Stop reason: HOSPADM

## 2022-11-02 RX ORDER — ONDANSETRON 2 MG/ML
INJECTION INTRAMUSCULAR; INTRAVENOUS AS NEEDED
Status: DISCONTINUED | OUTPATIENT
Start: 2022-11-02 | End: 2022-11-02 | Stop reason: HOSPADM

## 2022-11-02 RX ORDER — PROPOFOL 10 MG/ML
INJECTION, EMULSION INTRAVENOUS AS NEEDED
Status: DISCONTINUED | OUTPATIENT
Start: 2022-11-02 | End: 2022-11-02 | Stop reason: HOSPADM

## 2022-11-02 RX ORDER — SODIUM CHLORIDE 0.9 % (FLUSH) 0.9 %
5-40 SYRINGE (ML) INJECTION EVERY 8 HOURS
Status: DISCONTINUED | OUTPATIENT
Start: 2022-11-02 | End: 2022-11-02 | Stop reason: HOSPADM

## 2022-11-02 RX ORDER — KETOROLAC TROMETHAMINE 30 MG/ML
30 INJECTION, SOLUTION INTRAMUSCULAR; INTRAVENOUS
Status: DISCONTINUED | OUTPATIENT
Start: 2022-11-02 | End: 2022-11-03

## 2022-11-02 RX ORDER — ACETAMINOPHEN 325 MG/1
650 TABLET ORAL ONCE
Status: DISCONTINUED | OUTPATIENT
Start: 2022-11-02 | End: 2022-11-02 | Stop reason: HOSPADM

## 2022-11-02 RX ORDER — ONDANSETRON 2 MG/ML
4 INJECTION INTRAMUSCULAR; INTRAVENOUS AS NEEDED
Status: DISCONTINUED | OUTPATIENT
Start: 2022-11-02 | End: 2022-11-02 | Stop reason: HOSPADM

## 2022-11-02 RX ORDER — FENTANYL CITRATE 50 UG/ML
INJECTION, SOLUTION INTRAMUSCULAR; INTRAVENOUS AS NEEDED
Status: DISCONTINUED | OUTPATIENT
Start: 2022-11-02 | End: 2022-11-02 | Stop reason: HOSPADM

## 2022-11-02 RX ORDER — ONDANSETRON 2 MG/ML
4 INJECTION INTRAMUSCULAR; INTRAVENOUS
Status: DISCONTINUED | OUTPATIENT
Start: 2022-11-02 | End: 2022-11-03 | Stop reason: HOSPADM

## 2022-11-02 RX ORDER — HYDROMORPHONE HYDROCHLORIDE 1 MG/ML
0.2 INJECTION, SOLUTION INTRAMUSCULAR; INTRAVENOUS; SUBCUTANEOUS
Status: DISCONTINUED | OUTPATIENT
Start: 2022-11-02 | End: 2022-11-02 | Stop reason: HOSPADM

## 2022-11-02 RX ORDER — SODIUM CHLORIDE 0.9 % (FLUSH) 0.9 %
5-40 SYRINGE (ML) INJECTION EVERY 8 HOURS
Status: DISCONTINUED | OUTPATIENT
Start: 2022-11-02 | End: 2022-11-03 | Stop reason: HOSPADM

## 2022-11-02 RX ORDER — FENTANYL CITRATE 50 UG/ML
25 INJECTION, SOLUTION INTRAMUSCULAR; INTRAVENOUS
Status: COMPLETED | OUTPATIENT
Start: 2022-11-02 | End: 2022-11-02

## 2022-11-02 RX ORDER — DIPHENHYDRAMINE HYDROCHLORIDE 50 MG/ML
12.5 INJECTION, SOLUTION INTRAMUSCULAR; INTRAVENOUS
Status: DISCONTINUED | OUTPATIENT
Start: 2022-11-02 | End: 2022-11-03 | Stop reason: HOSPADM

## 2022-11-02 RX ORDER — SODIUM CHLORIDE 9 MG/ML
100 INJECTION, SOLUTION INTRAVENOUS CONTINUOUS
Status: DISCONTINUED | OUTPATIENT
Start: 2022-11-02 | End: 2022-11-03 | Stop reason: HOSPADM

## 2022-11-02 RX ORDER — SODIUM CHLORIDE 0.9 % (FLUSH) 0.9 %
5-40 SYRINGE (ML) INJECTION AS NEEDED
Status: DISCONTINUED | OUTPATIENT
Start: 2022-11-02 | End: 2022-11-03 | Stop reason: HOSPADM

## 2022-11-02 RX ORDER — TRAMADOL HYDROCHLORIDE 50 MG/1
50 TABLET ORAL
Status: DISCONTINUED | OUTPATIENT
Start: 2022-11-02 | End: 2022-11-03 | Stop reason: HOSPADM

## 2022-11-02 RX ADMIN — TRAMADOL HYDROCHLORIDE 50 MG: 50 TABLET ORAL at 15:24

## 2022-11-02 RX ADMIN — FENTANYL CITRATE 25 MCG: 50 INJECTION, SOLUTION INTRAMUSCULAR; INTRAVENOUS at 09:40

## 2022-11-02 RX ADMIN — KETOROLAC TROMETHAMINE 30 MG: 30 INJECTION, SOLUTION INTRAMUSCULAR at 12:37

## 2022-11-02 RX ADMIN — SODIUM CHLORIDE 100 ML/HR: 9 INJECTION, SOLUTION INTRAVENOUS at 10:14

## 2022-11-02 RX ADMIN — SODIUM CHLORIDE 100 ML/HR: 9 INJECTION, SOLUTION INTRAVENOUS at 18:52

## 2022-11-02 RX ADMIN — CEFOXITIN SODIUM 2 G: 2 POWDER, FOR SOLUTION INTRAVENOUS at 07:57

## 2022-11-02 RX ADMIN — HYDROMORPHONE HYDROCHLORIDE 0.5 MG: 1 INJECTION, SOLUTION INTRAMUSCULAR; INTRAVENOUS; SUBCUTANEOUS at 10:20

## 2022-11-02 RX ADMIN — ROCURONIUM BROMIDE 35 MG: 10 SOLUTION INTRAVENOUS at 07:45

## 2022-11-02 RX ADMIN — MORPHINE SULFATE 2 MG: 2 INJECTION, SOLUTION INTRAMUSCULAR; INTRAVENOUS at 09:38

## 2022-11-02 RX ADMIN — FENTANYL CITRATE 50 MCG: 50 INJECTION, SOLUTION INTRAMUSCULAR; INTRAVENOUS at 08:19

## 2022-11-02 RX ADMIN — FENTANYL CITRATE 25 MCG: 50 INJECTION, SOLUTION INTRAMUSCULAR; INTRAVENOUS at 09:45

## 2022-11-02 RX ADMIN — PROPOFOL 200 MG: 10 INJECTION, EMULSION INTRAVENOUS at 07:45

## 2022-11-02 RX ADMIN — FENTANYL CITRATE 50 MCG: 50 INJECTION, SOLUTION INTRAMUSCULAR; INTRAVENOUS at 09:23

## 2022-11-02 RX ADMIN — ROCURONIUM BROMIDE 20 MG: 10 SOLUTION INTRAVENOUS at 08:41

## 2022-11-02 RX ADMIN — ONDANSETRON 4 MG: 2 INJECTION INTRAMUSCULAR; INTRAVENOUS at 12:37

## 2022-11-02 RX ADMIN — GLYCOPYRROLATE 0.2 MG: 0.2 INJECTION INTRAMUSCULAR; INTRAVENOUS at 09:13

## 2022-11-02 RX ADMIN — DEXAMETHASONE SODIUM PHOSPHATE 4 MG: 4 INJECTION, SOLUTION INTRAMUSCULAR; INTRAVENOUS at 09:04

## 2022-11-02 RX ADMIN — SODIUM CHLORIDE 1000 ML: 9 INJECTION, SOLUTION INTRAVENOUS at 19:40

## 2022-11-02 RX ADMIN — SODIUM CHLORIDE, POTASSIUM CHLORIDE, SODIUM LACTATE AND CALCIUM CHLORIDE 125 ML/HR: 600; 310; 30; 20 INJECTION, SOLUTION INTRAVENOUS at 06:49

## 2022-11-02 RX ADMIN — ONDANSETRON HYDROCHLORIDE 4 MG: 2 INJECTION, SOLUTION INTRAMUSCULAR; INTRAVENOUS at 09:04

## 2022-11-02 RX ADMIN — ACETAMINOPHEN 650 MG: 325 TABLET ORAL at 20:19

## 2022-11-02 RX ADMIN — FENTANYL CITRATE 25 MCG: 50 INJECTION, SOLUTION INTRAMUSCULAR; INTRAVENOUS at 09:55

## 2022-11-02 RX ADMIN — TRAMADOL HYDROCHLORIDE 50 MG: 50 TABLET ORAL at 23:25

## 2022-11-02 RX ADMIN — MEPERIDINE HYDROCHLORIDE 12.5 MG: 25 INJECTION INTRAMUSCULAR; INTRAVENOUS; SUBCUTANEOUS at 09:40

## 2022-11-02 RX ADMIN — FENTANYL CITRATE 25 MCG: 50 INJECTION, SOLUTION INTRAMUSCULAR; INTRAVENOUS at 09:50

## 2022-11-02 RX ADMIN — ACETAMINOPHEN 650 MG: 325 TABLET ORAL at 15:07

## 2022-11-02 RX ADMIN — METOPROLOL TARTRATE 100 MG: 50 TABLET, FILM COATED ORAL at 20:19

## 2022-11-02 RX ADMIN — MIDAZOLAM 2 MG: 1 INJECTION INTRAMUSCULAR; INTRAVENOUS at 07:28

## 2022-11-02 RX ADMIN — MEPERIDINE HYDROCHLORIDE 12.5 MG: 25 INJECTION INTRAMUSCULAR; INTRAVENOUS; SUBCUTANEOUS at 10:00

## 2022-11-02 RX ADMIN — SUGAMMADEX 400 MG: 100 INJECTION, SOLUTION INTRAVENOUS at 09:13

## 2022-11-02 RX ADMIN — SODIUM CHLORIDE, PRESERVATIVE FREE 10 ML: 5 INJECTION INTRAVENOUS at 22:00

## 2022-11-02 NOTE — ANESTHESIA POSTPROCEDURE EVALUATION
Procedure(s):  ROBOTIC ASSISTED TOTAL LAPAROSCOPIC HYSTERECTOMY, BILATERAL SALPINGO-OOPHORECTOMY SENTENEL PELVIC LYMPH NODE DISSECTION. general    Anesthesia Post Evaluation        Patient location during evaluation: PACU  Patient participation: complete - patient participated  Level of consciousness: awake and alert  Pain management: adequate  Airway patency: patent  Anesthetic complications: no  Cardiovascular status: acceptable  Respiratory status: acceptable  Hydration status: acceptable  Comments: I have seen and evaluated the patient and is ready for discharge. Julisa Caicedo MD    Post anesthesia nausea and vomiting:  none      INITIAL Post-op Vital signs:   Vitals Value Taken Time   /64 11/02/22 0940   Temp     Pulse 69 11/02/22 0941   Resp 12 11/02/22 0941   SpO2 94 % 11/02/22 0941   Vitals shown include unvalidated device data.

## 2022-11-02 NOTE — ANESTHESIA PREPROCEDURE EVALUATION
Relevant Problems   No relevant active problems       Anesthetic History   No history of anesthetic complications            Review of Systems / Medical History  Patient summary reviewed, nursing notes reviewed and pertinent labs reviewed    Pulmonary  Within defined limits        Smoker  Asthma        Neuro/Psych   Within defined limits           Cardiovascular  Within defined limits  Hypertension          CAD         GI/Hepatic/Renal  Within defined limits   GERD           Endo/Other  Within defined limits      Arthritis and cancer     Other Findings              Physical Exam    Airway  Mallampati: II  TM Distance: > 6 cm  Neck ROM: normal range of motion   Mouth opening: Normal     Cardiovascular  Regular rate and rhythm,  S1 and S2 normal,  no murmur, click, rub, or gallop             Dental  No notable dental hx       Pulmonary  Breath sounds clear to auscultation               Abdominal  GI exam deferred       Other Findings            Anesthetic Plan    ASA: 3  Anesthesia type: general          Induction: Intravenous  Anesthetic plan and risks discussed with: Patient

## 2022-11-02 NOTE — PROGRESS NOTES
1050:TRANSFER - OUT REPORT:    Verbal report given to [facility] RN(name) on Grant De Jesus  being transferred to [facility](unit) for routine post - op       Report consisted of patients Situation, Background, Assessment and   Recommendations(SBAR). Time Pre op antibiotic [id number]  Anesthesia Stop time: 0930    Information from the following report(s) SBAR, Kardex, OR Summary, Intake/Output, and MAR was reviewed with the receiving nurse. Opportunity for questions and clarification was provided. Is the patient on 02? YES       L/Min 2         Is the patient on a monitor? NO    Is the nurse transporting with the patient? NO    Surgical Waiting Area notified of patient's transfer from PACU?  YES      The following personal items collected during your admission accompanied patient upon transfer:   Dental Appliance:    Vision: Visual Aid: Glasses (In PACU)  Hearing Aid:    Jewelry:    Clothing: Clothing: With patient  Other Valuables:    Valuables sent to safe:

## 2022-11-02 NOTE — BRIEF OP NOTE
Brief Postoperative Note    Patient: Chris Meehan  YOB: 1970  MRN: [id number]    Date of Procedure: 11/2/2022     Pre-Op Diagnosis: ENDOMETERIAL CANCER    Post-Op Diagnosis: Same as preoperative diagnosis. Procedure(s):  ROBOTIC ASSISTED TOTAL LAPAROSCOPIC HYSTERECTOMY, BILATERAL SALPINGO-OOPHORECTOMY SENTENEL PELVIC LYMPH NODE DISSECTION    Surgeon(s):  Leidy Saldivar MD    Surgical Assistant: Physician Assistant: Chanelle Churchill PA-C    Anesthesia: General     Estimated Blood Loss (mL): less than 50     Complications: None    Specimens:   ID Type Source Tests Collected by Time Destination   1 : LEFT PELVIC SENTINEL LYMPH NODE Fresh Lymph Node  Leidy Saldivar MD 11/2/2022 0624 Pathology   2 : UTERUS CERVIX BILATERAL FALLOPIAN TUBES AND OVARIES Fresh Uterus with Bilateral Fallopian tubes and Ovaries  Leidy Saldivar MD 11/2/2022 1772 Pathology   3 : RIGHT PELVIC SENTINEL LYMPH NODE Fresh Lymph Node  Leidy Saldivar MD 11/2/2022 0831 Pathology   1 : PELVIC WASHINGS Fresh Pelvis  Leidy Saldivar MD 11/2/2022 9813 Cytology        Implants: * No implants in log *    Drains: * No LDAs found *    Findings: Normal appearing uterus, tubes, and ovaries. Rydal nodes mapped bilaterally. No evidence of metastatic disease.     Electronically Signed by Stas Espinoza MD on 11/2/2022 at 9:02 AM

## 2022-11-02 NOTE — PROGRESS NOTES
Bedside and Verbal shift change report given to BHASKAR Garcia RN (oncoming nurse) by Tray Rincon (student) & Annette Alegre RN (offgoing nurse). Report included the following information SBAR and Kardex. 0031: pt urine output has been extremely low since she arrived on our floor. MD notified.

## 2022-11-02 NOTE — OP NOTES
Gynecologic Oncology Operative Report    Milady Bingham  11/2/2022    Pre-operative dx:  Endometrial cancer     Post-operative dx:  Endometrial cancer     Procedure:  1) Robotic-assisted total laparoscopic hysterectomy     2) Bilateral salpingooophorectomy     3) Laparoscopic sentinel pelvic lymph node dissection     Surgeon:  Joshua Keating MD     Assistant:  Maru Stokes PA-C (Assistance was required due to the difficulty of the procedure. They actively assisted with the necessary dissection and proper identification of relevant anatomy throughout the course of the procedure.)     Anesthesia:  GETA     EBL:  25 cc     Complications:  None    Implants:  None     Specimens:    ID Type Source Tests Collected by Time Destination   1 : LEFT PELVIC SENTINEL LYMPH NODE Fresh Lymph Node  Aldair Brower MD 11/2/2022 8966 Pathology   2 : UTERUS CERVIX BILATERAL FALLOPIAN TUBES AND OVARIES Fresh Uterus with Bilateral Fallopian tubes and Ovaries  Aldair Brower MD 11/2/2022 2814 Pathology   3 : RIGHT PELVIC SENTINEL LYMPH NODE Fresh Lymph Node  Aldair Brower MD 11/2/2022 0831 Pathology   1 : PELVIC WASHINGS Fresh Pelvis  Aldair Brower MD 11/2/2022 0274 Cytology     Operative indications:  46 yo WF with grade 1 endometrial cancer. I recommended robotic hysterectomy with sentinel node evaluation. Operative findings:  Normal appearing uterus, tubes, and ovaries. [place] nodes mapped bilaterally. No evidence of metastatic disease. Procedure in detail: After the risks, benefits, indications, and alternatives of the procedure were discussed with the patient and informed consent was obtained, the patient was taken to the operating room. She was positively identified, administered general anesthesia, and then placed in the dorsal lithotomy position in [address] [place] [address]. An exam under anesthesia was performed. She was then prepped and draped in the usual fashion. A garcia catheter was inserted.   An open-sided speculum was used to visualize the cervix. The cervix was grasped with a single-tooth tenaculum and then progressively dilated using cervical dilators. Indocyanine green was then injected on either side of the cervix in preparation for sentinel lymph node mapping. I then placed a [employer] uterine manipulator. We then proceeded with laparoscopy. A horizontal incision was made in the midline above the level of the umbilicus. A Veres needle was inserted into the peritoneal cavity and the placement was confirmed. The abdomen was then insufflated to a pressure of 15 mm Hg. An 8 mm da Robby trocar was then inserted at this site. The camera was then inserted to confirm proper placement. Three additional 8 mm da Robby trocars were then placed under direct visualization, two on the right, and one on the left. These were placed approximately 8 cm apart across the upper abdomen. An 8 mm Airseal assistant port was then placed in the left abdomen, 8 cm from the lateral da Robby trocar. Positioning of the trocars in the abdominal wall were then adjusted to locate the point of articulation at the level of the fascia. The patient was then placed in steep Trendelenberg position. The [employer] robot was then moved to the bedside and targeted over the patient. The arms were moved into position. The #2 port was then docked and the camera was inserted and advanced. The remaining trocars were then docked with the [employer]. A fenestrated bipolar grasper was placed in arm #1, monopolar scissors placed in arm #3, and a Prograsp placed in arm #4. I then removed my gown and made my way to the console. The abdomen and pelvis were then examined, with the above mentioned findings noted. Pelvic washings were obtained as well. We then proceeded with the dissection. The left round ligament was identified, then coagulated and transected with the cautery, allowing entry into the retroperitoneal space.  The vesico-uterine peritoneum was divided with cautery from the left side across the midline, allowing visualization of the ring of the V-Care manipulator anteriorly. We then used the near-infrared filter on the da Robby camera to visualize the retroperitoneum and identify the sentinel lymph node(s). The dissection was then carried out using a combination of sharp and blunt dissection and cautery as needed for hemostasis. We then directed our attention to the right side of the pelvis. The right round ligament was identified and then coagulated and transected with cautery, allowing entry into the retroperitoneal space. The remaining vesico-uterine peritoneum was then divided with on the right side. We then used the near-infrared filter on the da Robby camera to visualize the retroperitoneum and identify the sentinel lymph node(s). The dissection was then carried out using a combination of sharp and blunt dissection and cautery as needed for hemostasis. We then moved back to the left side of the pelvis. We identified the left ureter and bluntly developed the perirectal space. The left uterine artery was identified at its origin off of the hypogastric artery, and it was coagulated with bipolar cautery at that point, with the ureter being held on traction medially to ensure its safety. The left ovarian vessels were then isolated and then coagulated and transected with cautery. The posterior leaf of the broad ligament was then divided with the cautery up to the level of the uterine body. We then moved back to the right side of the pelvis. The right ureter was then identified and the perirectal space was bluntly developed. The right uterine artery was then identified at its origin off of the hypogastric artery. It was coagulated with bipolar cautery at that point, with the ureter being held on traction medially to ensure its safety. The right ovarian vessels were then isolated and then coagulated and transected with cautery.  The posterior leaf of the broad ligament was then divided with cautery up to the level of the uterine body. We then moved anteriorly and the bladder was sharply dissected off of the lower uterine segment and cervix until it was well below the ring of the [employer]. The uterine arteries were then skeletonized bilaterally and then coagulated with bipolar cautery and transected with monopolar cautery at the level of the V-Care ring. A circumferential colpotomy was then performed by using the monopolar scissors to cut down onto the V-Care ring. Once the colpotomy was completed, the specimen was delivered transvaginally and sent to pathology. A bulb syringe was then placed into the vagina to maintain pneumoperitoneum for vaginal cuff closure. The scissors were then removed from arm #3 and replaced with a [employer] needle . The cuff was then reapproximated with a running closure using a 2-0 Stratafix suture. Excellent reapproximation and hemostasis was noted. The pelvis was then irrigated and all pedicles inspected. Once satisfied with hemostasis, the gas was then allowed to escape from the abdomen and the trochars were removed. She was then taken out of Trendelenburg tilt. The incision sites were closed with a stitch of 4-0 Monocryl, followed by a layer of Dermabond. The bulb syringe was then removed from the vagina. The patient was taken out of stirrups, awakened from anesthesia, and taken to the recovery room in stable condition. All instrument, sponge, and needle counts were correct.      Precious Conrad MD  11/2/2022  9:05 AM

## 2022-11-03 VITALS
DIASTOLIC BLOOD PRESSURE: 69 MMHG | HEIGHT: 64 IN | TEMPERATURE: 97.8 F | BODY MASS INDEX: 44.22 KG/M2 | HEART RATE: 60 BPM | WEIGHT: 259 LBS | RESPIRATION RATE: 16 BRPM | OXYGEN SATURATION: 95 % | SYSTOLIC BLOOD PRESSURE: 124 MMHG

## 2022-11-03 LAB
ANION GAP SERPL CALC-SCNC: 5 MMOL/L (ref 5–15)
BUN SERPL-MCNC: 14 MG/DL (ref 6–20)
BUN/CREAT SERPL: 12 (ref 12–20)
CALCIUM SERPL-MCNC: 8.6 MG/DL (ref 8.5–10.1)
CHLORIDE SERPL-SCNC: 107 MMOL/L (ref 97–108)
CO2 SERPL-SCNC: 25 MMOL/L (ref 21–32)
CREAT SERPL-MCNC: 1.15 MG/DL (ref 0.55–1.02)
ERYTHROCYTE [DISTWIDTH] IN BLOOD BY AUTOMATED COUNT: 13.1 % (ref 11.5–14.5)
GLUCOSE SERPL-MCNC: 138 MG/DL (ref 65–100)
HCT VFR BLD AUTO: 43.1 % (ref 35–47)
HGB BLD-MCNC: 13.5 G/DL (ref 11.5–16)
MCH RBC QN AUTO: 28.2 PG (ref 26–34)
MCHC RBC AUTO-ENTMCNC: 31.3 G/DL (ref 30–36.5)
MCV RBC AUTO: 90.2 FL (ref 80–99)
NRBC # BLD: 0 K/UL (ref 0–0.01)
NRBC BLD-RTO: 0 PER 100 WBC
PLATELET # BLD AUTO: 182 K/UL (ref 150–400)
PMV BLD AUTO: 10.7 FL (ref 8.9–12.9)
POTASSIUM SERPL-SCNC: 4.8 MMOL/L (ref 3.5–5.1)
RBC # BLD AUTO: 4.78 M/UL (ref 3.8–5.2)
SODIUM SERPL-SCNC: 137 MMOL/L (ref 136–145)
WBC # BLD AUTO: 11.4 K/UL (ref 3.6–11)

## 2022-11-03 PROCEDURE — 74011250637 HC RX REV CODE- 250/637: Performed by: OBSTETRICS & GYNECOLOGY

## 2022-11-03 PROCEDURE — 74011250636 HC RX REV CODE- 250/636: Performed by: OBSTETRICS & GYNECOLOGY

## 2022-11-03 PROCEDURE — 74011000250 HC RX REV CODE- 250: Performed by: OBSTETRICS & GYNECOLOGY

## 2022-11-03 PROCEDURE — 36415 COLL VENOUS BLD VENIPUNCTURE: CPT

## 2022-11-03 PROCEDURE — 85027 COMPLETE CBC AUTOMATED: CPT

## 2022-11-03 PROCEDURE — 80048 BASIC METABOLIC PNL TOTAL CA: CPT

## 2022-11-03 RX ORDER — TRAMADOL HYDROCHLORIDE 50 MG/1
50 TABLET ORAL
Qty: 20 TABLET | Refills: 0 | Status: SHIPPED | OUTPATIENT
Start: 2022-11-03 | End: 2022-11-08

## 2022-11-03 RX ORDER — DOCUSATE SODIUM 100 MG/1
100 CAPSULE, LIQUID FILLED ORAL DAILY
Qty: 30 CAPSULE | Refills: 0 | Status: SHIPPED | OUTPATIENT
Start: 2022-11-03 | End: 2022-12-03

## 2022-11-03 RX ADMIN — ACETAMINOPHEN 650 MG: 325 TABLET ORAL at 03:12

## 2022-11-03 RX ADMIN — ACETAMINOPHEN 650 MG: 325 TABLET ORAL at 09:42

## 2022-11-03 RX ADMIN — PANTOPRAZOLE SODIUM 40 MG: 40 TABLET, DELAYED RELEASE ORAL at 06:46

## 2022-11-03 RX ADMIN — SODIUM CHLORIDE, PRESERVATIVE FREE 10 ML: 5 INJECTION INTRAVENOUS at 06:00

## 2022-11-03 RX ADMIN — ONDANSETRON 4 MG: 2 INJECTION INTRAMUSCULAR; INTRAVENOUS at 03:16

## 2022-11-03 RX ADMIN — METOPROLOL TARTRATE 100 MG: 50 TABLET, FILM COATED ORAL at 09:42

## 2022-11-03 RX ADMIN — SODIUM CHLORIDE 100 ML/HR: 9 INJECTION, SOLUTION INTRAVENOUS at 05:01

## 2022-11-03 RX ADMIN — TRAMADOL HYDROCHLORIDE 50 MG: 50 TABLET ORAL at 09:42

## 2022-11-03 NOTE — DISCHARGE INSTRUCTIONS
[address] department of [address], [address], [address]  P [phone]  F [phone]       Juan F Santizo      Dear Ms. Irina Fitch,      Please review your instructions with your nurse and ask any questions so you have all the information you need to recover well at home. If you do not feel you have everything you need to succeed and be safe after you leave the hospital, please discuss these concerns with your nurse. As always, call for any questions at home. Your doctor: Nikos Roach  Diagnosis: Endometrial cancer [facility]) [C54.1]  Procedure: [unfilled]  Date of Discharge: 11/03/22        Take Home Medications     See Discharge Medication Review provided to you by your nurse. If you did not receive one, request this prior to your discharge. It is important that you take your medications as they are prescribed. Your prescriptions are sent to your pharmacy on record. Keep your medications in the bottles provided by the pharmacist and keep a list of the medication names, dosages, times to be taken and what they are for in your wallet. Do not take other medications without consulting your doctor. If you are prescribed enoxaparin (Lovenox) or Apixaban (Eliquis) following your surgery and are taking a baby aspirin daily, please stop taking the Aspirin until your course of enoxaparin/Eliquis is completed. You may take Tylenol and Ibuprofen or Aleve for pain. If this is not sufficient to control your pain, Tramadol or another pain medication will be prescribed for you. You should take a daily gentle stool softener such as a colace pill or dulcolax suppository for constipation as this is not uncommon after surgery and/or while on pain medication. If not prescribed, this can be found over the counter. If constipation persists for >24 hours you should take a dose of Milk of Magnesia.  Call if your constipation continues. Diet    Stay hydrated and drink fluids such as gatorade and water. This will also help prevent constipation and dehydration. Limit any usual caffeine intake such as soda, tea and coffee as these may serve to dehydrate you. For the first 2-3 days following your procedure, keep a low fiber diet avoiding raw vegetables or fruits with skin. Choose a diet consisting of soup, cereal, yogurt, eggs, fish, Boost/Ensure. Avoid fatty/greasy foods. If nauseated, keep your diet limited to liquids and call if this persists. Activity    If possible, have someone with you at all times until you feel stable. Gradually increase your activity each day. There are generally no restrictions on walking, climbing stairs or riding in a car. Walk at least 4 times per day. Walking will help reduce the risk of blood blots, constipation and pneumonia. [place] are okay. If you have an incision, no tub bathing/swimming for 3-4 weeks. No swimming or other submerging under water for the same amount of time. No driving for 2 week and/or while on pain medication. If you had surgery, the following restrictions are in place:  No heavy lifting greater than 10 lbs for the first 3 weeks following surgery and then no more than 25 lbs for the next 3 weeks. If you had a hysterectomy, nothing per vagina for 6-8 weeks. You may experience vaginal spotting/discharge following your procedure. Should the bleeding require more than 4 pads a day, please call the office. Incision    You should expect some discomfort in the area of your incision, particularly as you increase your activity. If you notice an area of increasing redness or new drainage, please call your doctor. Staples are generally removed in 10-14 days. Many incisions will have buried absorbable sutures, which do not need to be removed and are covered by protective glue. Please allow this glue to come off on its own.         Causes For Concern    If any of the following occur, please call our office and speak with the Nurse/aid who will help you with your problem or ask the doctor to call you. Problems with the incision, including increasing pain, swelling, redness or drainage. Inability to pass urine   Increasing abdominal pain despite medication  Persisting nausea or vomiting. Fever or chills and a temperature >101F  Constipation (no bowel movement for three days). Diarrhea (more than three watery stools within 24 hours). Excessive vaginal or wound bleeding. If after hours and you cannot reach an on-call physician, call 911. Follow-Up    Call [phone] to schedule an appointment with Dr. Yvon Santana in 4 week. Information obtained by :  I understand that if any problems occur once I am at home I am to contact my physician. I understand and acknowledge receipt of the instructions indicated above.                                                                                                                                            Physician's or R.N.'s Signature                                                                  Date/Time                                                                                                                                              Patient or Representative Signature                                                          Date/Time

## 2022-11-03 NOTE — PROGRESS NOTES
0730: Bedside and Verbal shift change report given to SILVIANO Haynes RN (oncoming nurse) by BHASKAR Lee RN (offgoing nurse). Report included the following information SBAR, Kardex, Intake/Output, MAR, and Recent Results. 1320: I have reviewed discharge instructions with the patient and spouse. The patient and spouse verbalized understanding. Discharge medications reviewed with patient and spouse and appropriate educational materials and side effects teaching were provided.

## 2022-11-03 NOTE — PROGRESS NOTES
A Spiritual Care Partner Volunteer visited patient in Room 303 on 11/3/2022  Documented by:  Chaplain Avendaño MDiv, MS, [facility]

## 2022-11-03 NOTE — PROGRESS NOTES
[facility] GYNECOLOGIC ONCOLOGY  A department of [facility] [address], [facility] [address], [address]  P [phone]  F [phone]       Patient Name: Jennifer Hashimoto   Admit Date: 11/2/2022   OR Date: 11/2/2022   Visit Date: 11/3/2022        SUBJECTIVE:  Doing well this morning. Low urine output overnight. Picking up following NS bolus. Tolerating regular diet. Gutiérrez removed this morning. Has been able to void.      OBJECTIVE:    Patient Vitals for the past 24 hrs:   Temp Pulse Resp BP SpO2   11/03/22 0421 98 °F (36.7 °C) 61 18 (!) 163/81 95 %   11/03/22 0020 97.6 °F (36.4 °C) (!) 57 18 (!) 161/87 95 %   11/02/22 2015 98.1 °F (36.7 °C) 61 18 (!) 168/71 95 %   11/02/22 1639 98 °F (36.7 °C) (!) 58 18 (!) 161/78 96 %   11/02/22 1545 98.1 °F (36.7 °C) (!) 55 20 (!) 164/70 97 %   11/02/22 1456 97.8 °F (36.6 °C) (!) 51 18 (!) 143/78 99 %   11/02/22 1330 97.5 °F (36.4 °C) (!) 53 18 (!) 145/73 98 %   11/02/22 1234 -- (!) 55 16 (!) 146/74 95 %   11/02/22 1131 97.4 °F (36.3 °C) (!) 58 15 (!) 148/75 97 %   11/02/22 1115 -- (!) 53 14 131/75 94 %   11/02/22 1100 -- (!) 56 19 127/73 100 %   11/02/22 1045 97.3 °F (36.3 °C) (!) 55 -- -- --   11/02/22 1030 -- 61 12 (!) 113/59 99 %   11/02/22 1015 -- 65 17 (!) 124/44 99 %   11/02/22 1000 -- 65 8 (!) 140/75 98 %   11/02/22 0955 -- 65 12 129/75 96 %   11/02/22 0950 -- 67 11 129/75 97 %   11/02/22 0945 -- 69 11 (!) 126/55 98 %   11/02/22 0940 -- 69 17 124/64 94 %   11/02/22 0935 -- 68 18 (!) 124/47 97 %   11/02/22 0930 97.8 °F (36.6 °C) 69 16 138/81 91 %       Date 11/02/22 0700 - 11/03/22 0659 11/03/22 0700 - 11/04/22 0659   Shift 3147-2487 7587-2667 24 Hour Total 7993-0264 6929-3712 24 Hour Total   INTAKE   I.V.(mL/kg/hr) 20(0)  20(0)        Volume (cefOXitin (MEFOXIN) 2 g in sterile water (preservative free) 20 mL iv syringe) 20  20      Shift Total(mL/kg) 20(0.2)  20(0.2)      OUTPUT   Urine(mL/kg/hr) 200(0.1) 750(0.5) 950(0.3) 200  200     Urine Voided    200  200     Urine Occurrence(s)    1 x  1 x     Urine Output (mL) ([REMOVED] Urinary Catheter 11/02/22 2- way; Gutiérrez) 200 750 950      Shift Total(mL/kg) 200(1.7) 750(6.4) 950(8.1) 200(1.7)  200(1.7)   NET -180 -750 -930 -200  -200   Weight (kg) 117.5 117.5 117.5 117.5 117.5 117.5       Physical Exam     General:  alert, cooperative, no distress     Cardiac:  Regular rate and rhythm        Lungs:  clear to auscultation bilaterally  Abdomen:  soft, non-tender, without masses or organomegaly       Wound:  clean, dry   Extremity: extremities normal, atraumatic, no cyanosis or edema    Data Review  Lab Results   Component Value Date/Time    WBC 11.4 (H) 11/03/2022 03:30 AM    ABS. NEUTROPHILS 3.2 10/27/2022 01:59 PM    HGB 13.5 11/03/2022 03:30 AM    HCT 43.1 11/03/2022 03:30 AM    MCV 90.2 11/03/2022 03:30 AM    MCH 28.2 11/03/2022 03:30 AM    PLATELET 341 27/81/1758 03:30 AM     Lab Results   Component Value Date/Time    Sodium 137 11/03/2022 03:30 AM    Potassium 4.8 11/03/2022 03:30 AM    Chloride 107 11/03/2022 03:30 AM    CO2 25 11/03/2022 03:30 AM    Glucose 138 (H) 11/03/2022 03:30 AM    BUN 14 11/03/2022 03:30 AM    Creatinine 1.15 (H) 11/03/2022 03:30 AM    Calcium 8.6 11/03/2022 03:30 AM    Albumin 3.3 (L) 10/27/2022 01:59 PM    Bilirubin, total 0.4 10/27/2022 01:59 PM    ALT (SGPT) 18 10/27/2022 01:59 PM    Alk. phosphatase 89 10/27/2022 01:59 PM     IMPRESSION/PLAN:    1 Day Post-Op Procedure(s):  ROBOTIC ASSISTED TOTAL LAPAROSCOPIC HYSTERECTOMY, BILATERAL SALPINGO-OOPHORECTOMY SENTENEL PELVIC LYMPH NODE DISSECTION for ENDOMETERIAL CANCER    Oncologic:  44 yo WF with grade 1 endometrial cancer. Heme/CV:  Hgb 13.5. VSS. Hemodynamically stable. Renal:  Creatinine 1.15.  slightly elevated compared to baseline. Receiving fluid bolus last night. Urine output improving. Hold toradol. FEN/GI:  No nausea. Tolerating regular diet. ID/Wound:  Afebrile. Incisions and abdomen benign. PPX: SCDs. Encourage OOB   Dispostion:  Doing well postoperatively. Continue routine post op care. Discharge instructions reviewed. Questions answered. [id number] for discharge.         Becky Blanco PA-C  11/3/2022  8:01 AM

## 2022-11-03 NOTE — PROGRESS NOTES
Medicare Outpatient Observation Notice (MOON)/ [place] Outpatient Observation Notice (Ling Backers) provided to patient/representative with verbal explanation of the notice. Time allotted for questions regarding the notice. Patient /representative provided a completed copy of the MOON/VOON notice. Copy placed on bedside chart.     Saranya Feliciano MS

## 2022-12-01 ENCOUNTER — OFFICE VISIT (OUTPATIENT)
Dept: GYNECOLOGY | Age: 52
End: 2022-12-01
Payer: COMMERCIAL

## 2022-12-01 VITALS
WEIGHT: 259 LBS | HEIGHT: 64 IN | SYSTOLIC BLOOD PRESSURE: 134 MMHG | DIASTOLIC BLOOD PRESSURE: 82 MMHG | HEART RATE: 73 BPM | BODY MASS INDEX: 44.22 KG/M2

## 2022-12-01 DIAGNOSIS — C54.1 ENDOMETRIAL CANCER (HCC): Primary | ICD-10-CM

## 2022-12-01 PROCEDURE — 99024 POSTOP FOLLOW-UP VISIT: CPT | Performed by: OBSTETRICS & GYNECOLOGY

## 2022-12-01 NOTE — PROGRESS NOTES
[address] [facility] [address], [address] [facility]  P [phone]  F [phone]    Postoperative Office Note  Patient ID:  Name: Gaby Stratton  MRM: [id number]  : 1970/52 y.o. Date: 2022    Diagnosis:     ICD-10-CM ICD-9-CM    1. Endometrial cancer ([facility] [address]  C54.1 182.0           Problem List:   Patient Active Problem List   Diagnosis Code    Endometrial cancer ([facility] [address] C54.1       SUBJECTIVE:    Gaby Stratton is a 45 y.o. female who presents for followup postoperative care following robotic-assisted laparoscopic hysterectomy, bilateral salpingooophorectomy, sentinel node dissection. Operative findings:  Normal appearing uterus, tubes, and ovaries. Lockwood nodes mapped bilaterally. No evidence of metastatic disease. The patient's pathology revealed: FINAL PATHOLOGIC DIAGNOSIS   1. Lymph node, left pelvic sentinel lymph node, sentinel lymph node excision:   One lymph node negative for metastatic carcinoma. 2. Uterus, bilateral fallopian tubes and ovaries, total hysterectomy bilateral salpingo-oophorectomy:   TUMOR   Tumor Site: Endometrium   Histologic Type: Endometrioid carcinoma, with focal mucinous metaplasia   Histologic Grade: FIGO grade 1   Tumor Size: 3.8 cm   Myometrial Invasion: Present   Depth of Myometrial Invasion: 6 mm   Myometrial Thickness: 1.6 mm   Percentage of Myometrial Invasion: 40%   Adenomyosis: Not identified   Uterine Serosa Involvement: Not identified   Cervical Stromal Involvement: Not identified   Other Tissue / Organ Involvement: Not applicable   Peritoneal Ascitic Fluid: Results pending   Lymphovascular Invasion: Not identified   LYMPH NODES   Lymph Node Status:  All lymph nodes negative for tumor cells   Total Number of Pelvic Nodes Examined: 2   Number of Pelvic Lockwood Nodes Examined: 2   Total Number of Para-aortic Nodes Examined: 0   PATHOLOGIC STAGE CLASSIFICATION (pTNM, AJCC 8th Edition)   Primary Tumor (pT): pT1aN0 (sn)   FIGO STAGE   FIGO Stage: IA   ADDITIONAL FINDINGS   Additional Findings:   Leiomyoma   Bilateral fallopian tubes: Negative for microscopic pathologic abnormality   Bilateral ovaries: Negative for microscopic pathologic abnormality   3. Lymph node, right node pelvic sentinel lymph node, sentinel lymph node excision:   One lymph negative for metastatic carcinoma. Currently she has no problems with eating, bowel movements, voiding, or their wound. Appetite is good. Eating a regular diet without difficulty. Bowel movements are regular. The patient is not having any pain. Medications:     Current Outpatient Medications on File Prior to Visit   Medication Sig Dispense Refill    docusate sodium (COLACE) 100 mg capsule Take 1 Capsule by mouth daily for 30 days. Stop taking if you develop loose stools 30 Capsule 0    phenol throat spray (CHLORASEPTIC) 1.4 % spray Take 1 Spray by mouth as needed for Sore throat. fluticasone propionate (FLONASE ALLERGY RELIEF NA) Flonase Allergy Relief      LORATADINE, BULK, loratadine (bulk)      acetaminophen (TYLENOL) 500 mg tablet Take  by mouth every six (6) hours as needed. albuterol (PROVENTIL VENTOLIN) 2.5 mg /3 mL (0.083 %) nebu albuterol sulfate 2.5 mg/3 mL (0.083 %) solution for nebulization   TAKE 3 ML AS NEEDED INHALATION EVERY 4 TO 6 HOURS 30 DAYS      albuterol (PROVENTIL HFA, VENTOLIN HFA, PROAIR HFA) 90 mcg/actuation inhaler albuterol sulfate HFA 90 mcg/actuation aerosol inhaler      omeprazole (PRILOSEC) 40 mg capsule TAKE 1 CAPSULE BY MOUTH ONCE A DAY 1/2 HOUR BEFORE BREAKFAST      metoprolol tartrate (LOPRESSOR) 100 mg IR tablet two (2) times a day. aspirin delayed-release 81 mg tablet Take  by mouth daily. (Patient not taking: Reported on 12/1/2022)       No current facility-administered medications on file prior to visit. Allergies:      Allergies   Allergen Reactions    Other Food Hives    Food [Shellfish Derived] Anaphylaxis    [facility] Anaphylaxis    Venom-Wasp Protein Anaphylaxis    Doxycycline Hives and Nausea Only    Iodine Vertigo and Unknown (comments)    Mushroom Hives    Prednisone Vertigo    Penicillins Nausea and Vomiting     Patient screened for any delayed non-IgE-mediated reaction to PCN.         Patient notes the following:    No delayed non-IgE-mediated reaction to PCN              Past Medical History:   Diagnosis Date    Adverse effect of anesthesia     \"HR WENT UP AND HAD TROUBLE BREATHING AFTER SURGERY\"    Arthritis     Asthma     CAD (coronary artery disease)     Cancer ([facility] [address] 2022    UTERINE    GERD (gastroesophageal reflux disease)     Hypertension      Past Surgical History:   Procedure Laterality Date    HX BREAST LUMPECTOMY      HX COLONOSCOPY      HX DILATION AND CURETTAGE  09/26/2022    HX ENDOSCOPY      HX GYN  2022    UTERINE BIOPSY    HX HEENT      TEETH PULLED UNDER ANESTHESIA    HX ORTHOPAEDIC      tumor in finger     Social History     Socioeconomic History    Marital status:      Spouse name: Not on file    Number of children: Not on file    Years of education: Not on file    Highest education level: Not on file   Occupational History    Not on file   Tobacco Use    Smoking status: Every Day     Packs/day: 1.00     Years: 40.00     Pack years: 40.00     Types: Cigarettes    Smokeless tobacco: Never   Vaping Use    Vaping Use: Former    Substances: Nicotine, Flavoring, \"HAD TO STOP DUE TO TINGLING IN MOUTH AND SOB\"   Substance and Sexual Activity    Alcohol use: Never    Drug use: Not Currently    Sexual activity: Not on file   Other Topics Concern    Not on file   Social History Narrative    Not on file     Social Determinants of Health     Financial Resource Strain: Not on file   Food Insecurity: Not on file   Transportation Needs: Not on file   Physical Activity: Not on file   Stress: Not on file   Social Connections: Not on file   Intimate Partner Violence: Not on file   Housing Stability: Not on file OBJECTIVE:    Vitals:   Vitals:    12/01/22 1450   BP: 134/82   Pulse: 73   Weight: 259 lb (117.5 kg)   Height: 5' 4.02\" (1.626 m)     Physical Examination:     General:  alert, cooperative, no distress   Lungs: lungs clear to auscultation   Cardiac: Regular rate and rhythm   Abdomen: soft, bowel sounds active, non-tender  No CVA tenderness   Incision: healing well   Pelvic: External genitalia: normal general appearance  Urinary system: urethral meatus normal  Vaginal: normal without tenderness, induration or masses  Cervix: removed surgically  Adnexa: removed surgically  Uterus: removed surgically   Rectal: not done   Extremity:   extremities normal, atraumatic, no cyanosis or edema     IMPRESSION:    Reji Agudelo is doing well postoperatively. She has a diagnosis of stage IA, grade 1 endometrial cancer. Medical problems:     Patient Active Problem List   Diagnosis Code    [facility] C54.1       PLAN:    The operative procedures and clinical results have been reviewed with the patient. Implications of diagnosis discussed at length. All questions answered. Based upon her favorable pathology, I do not recommend any additional therapy. I discussed with her the rationale for and schedule of routine surveillance. The patient may return to all of her normal activities without restrictions. I will see the patient back in 3 months for surveillance. The patient is advised to call our office with any problems or concerns. An electronic signature was used to sign this note.     Lorraine Suarez MD  12/1/2022

## 2022-12-29 ENCOUNTER — OFFICE VISIT (OUTPATIENT)
Dept: GYNECOLOGY | Age: 52
End: 2022-12-29
Payer: COMMERCIAL

## 2022-12-29 ENCOUNTER — TELEPHONE (OUTPATIENT)
Dept: GYNECOLOGY | Age: 52
End: 2022-12-29

## 2022-12-29 VITALS
HEIGHT: 64 IN | DIASTOLIC BLOOD PRESSURE: 79 MMHG | BODY MASS INDEX: 44.05 KG/M2 | SYSTOLIC BLOOD PRESSURE: 125 MMHG | WEIGHT: 258 LBS | HEART RATE: 71 BPM

## 2022-12-29 DIAGNOSIS — R10.30 LOWER ABDOMINAL PAIN: ICD-10-CM

## 2022-12-29 DIAGNOSIS — C54.1 ENDOMETRIAL CANCER (HCC): Primary | ICD-10-CM

## 2022-12-29 PROCEDURE — 99024 POSTOP FOLLOW-UP VISIT: CPT | Performed by: OBSTETRICS & GYNECOLOGY

## 2022-12-29 NOTE — PROGRESS NOTES
Patient is 8 weeks post operative and continues to have abdominal pain and discomfort when shirt touches.

## 2022-12-29 NOTE — PROGRESS NOTES
[address] [facility] [address], [address] [facility] [employer] [facility]  P [phone]  F [phone]    Postoperative Office Note  Patient ID:  Name: Quinten López  MRM: [id number]  : 1970/52 y.o. Date: 2022    Diagnosis:     ICD-10-CM ICD-9-CM    1. Endometrial cancer (HCC)  C54.1 182.0 CT ABD PELV W CONT      2. Lower abdominal pain  R10.30 789.09 CT ABD PELV W CONT            Problem List:   Patient Active Problem List   Diagnosis Code    Endometrial cancer ([facility] [address] C54.1       SUBJECTIVE:    Quinten López is a 45 y.o. female who presents for followup postoperative care following robotic-assisted laparoscopic hysterectomy, bilateral salpingooophorectomy, sentinel node dissection on 22. Operative findings:  Normal appearing uterus, tubes, and ovaries. Vancleve nodes mapped bilaterally. No evidence of metastatic disease. The patient's pathology revealed: FINAL PATHOLOGIC DIAGNOSIS   1. Lymph node, left pelvic sentinel lymph node, sentinel lymph node excision:   One lymph node negative for metastatic carcinoma. 2. Uterus, bilateral fallopian tubes and ovaries, total hysterectomy bilateral salpingo-oophorectomy:   TUMOR   Tumor Site: Endometrium   Histologic Type: Endometrioid carcinoma, with focal mucinous metaplasia   Histologic Grade: FIGO grade 1   Tumor Size: 3.8 cm   Myometrial Invasion: Present   Depth of Myometrial Invasion: 6 mm   Myometrial Thickness: 1.6 mm   Percentage of Myometrial Invasion: 40%   Adenomyosis: Not identified   Uterine Serosa Involvement: Not identified   Cervical Stromal Involvement: Not identified   Other Tissue / Organ Involvement: Not applicable   Peritoneal Ascitic Fluid: Results pending   Lymphovascular Invasion: Not identified   LYMPH NODES   Lymph Node Status:  All lymph nodes negative for tumor cells   Total Number of Pelvic Nodes Examined: 2   Number of Pelvic Vancleve Nodes Examined: 2   Total Number of Para-aortic Nodes Examined: 0 PATHOLOGIC STAGE CLASSIFICATION (pTNM, AJCC 8th Edition)   Primary Tumor (pT): pT1aN0 (sn)   FIGO STAGE   FIGO Stage: IA   ADDITIONAL FINDINGS   Additional Findings:   Leiomyoma   Bilateral fallopian tubes: Negative for microscopic pathologic abnormality   Bilateral ovaries: Negative for microscopic pathologic abnormality   3. Lymph node, right node pelvic sentinel lymph node, sentinel lymph node excision:   One lymph negative for metastatic carcinoma. She was seen in the office on 12/1/22 for her postoperative visit and everything appeared normal or expected at that time. She called recently with continued pain and is concerned that she is still having discomfort this far out from surgery. I suggested that she come in for evaluation. Medications:     Current Outpatient Medications on File Prior to Visit   Medication Sig Dispense Refill    phenol throat spray (CHLORASEPTIC) 1.4 % spray Take 1 Spray by mouth as needed for Sore throat. fluticasone propionate (FLONASE ALLERGY RELIEF NA) Flonase Allergy Relief      LORATADINE, BULK, loratadine (bulk)      acetaminophen (TYLENOL) 500 mg tablet Take  by mouth every six (6) hours as needed. albuterol (PROVENTIL VENTOLIN) 2.5 mg /3 mL (0.083 %) nebu albuterol sulfate 2.5 mg/3 mL (0.083 %) solution for nebulization   TAKE 3 ML AS NEEDED INHALATION EVERY 4 TO 6 HOURS 30 DAYS      albuterol (PROVENTIL HFA, VENTOLIN HFA, PROAIR HFA) 90 mcg/actuation inhaler albuterol sulfate HFA 90 mcg/actuation aerosol inhaler      omeprazole (PRILOSEC) 40 mg capsule TAKE 1 CAPSULE BY MOUTH ONCE A DAY 1/2 HOUR BEFORE BREAKFAST      metoprolol tartrate (LOPRESSOR) 100 mg IR tablet two (2) times a day. aspirin delayed-release 81 mg tablet Take  by mouth daily. (Patient not taking: No sig reported)       No current facility-administered medications on file prior to visit. Allergies:      Allergies   Allergen Reactions    Other Food Hives    Food Education Elementss Embranement Derived] Anaphylaxis    Venom-Honey Bee Anaphylaxis    Venom-Wasp Protein Anaphylaxis    Doxycycline Hives and Nausea Only    Iodine Vertigo and Unknown (comments)    Mushroom Hives    Prednisone Vertigo    Penicillins Nausea and Vomiting     Patient screened for any delayed non-IgE-mediated reaction to PCN.         Patient notes the following:    No delayed non-IgE-mediated reaction to PCN              Past Medical History:   Diagnosis Date    Adverse effect of anesthesia     \"HR WENT UP AND HAD TROUBLE BREATHING AFTER SURGERY\"    Arthritis     Asthma     CAD (coronary artery disease)     Cancer ([facility] [address] 2022    UTERINE    GERD (gastroesophageal reflux disease)     Hypertension      Past Surgical History:   Procedure Laterality Date    HX BREAST LUMPECTOMY      HX COLONOSCOPY      HX DILATION AND CURETTAGE  09/26/2022    HX ENDOSCOPY      HX GYN  2022    UTERINE BIOPSY    HX HEENT      TEETH PULLED UNDER ANESTHESIA    HX ORTHOPAEDIC      tumor in finger     Social History     Socioeconomic History    Marital status:      Spouse name: Not on file    Number of children: Not on file    Years of education: Not on file    Highest education level: Not on file   Occupational History    Not on file   Tobacco Use    Smoking status: Every Day     Packs/day: 1.00     Years: 40.00     Pack years: 40.00     Types: Cigarettes    Smokeless tobacco: Never   Vaping Use    Vaping Use: Former    Substances: Nicotine, Flavoring, \"HAD TO STOP DUE TO TINGLING IN MOUTH AND SOB\"   Substance and Sexual Activity    Alcohol use: Never    Drug use: Not Currently    Sexual activity: Not on file   Other Topics Concern    Not on file   Social History Narrative    Not on file     Social Determinants of Health     Financial Resource Strain: Not on file   Food Insecurity: Not on file   Transportation Needs: Not on file   Physical Activity: Not on file   Stress: Not on file   Social Connections: Not on file   Intimate Partner Violence: Not on file   Housing Stability: Not on file       OBJECTIVE:    Vitals:   Vitals:    12/29/22 1302   BP: 125/79   Pulse: 71   Weight: 258 lb (117 kg)   Height: 5' 4.02\" (1.626 m)     Physical Examination:     General:  alert, cooperative, no distress   Lungs: lungs clear to auscultation   Cardiac: Regular rate and rhythm   Abdomen: soft, bowel sounds active, non-tender  No CVA tenderness   Incision: healing well   Pelvic: External genitalia: normal general appearance  Urinary system: urethral meatus normal  Vaginal: normal without tenderness, induration or masses  Cervix: removed surgically  Adnexa: removed surgically  Uterus: removed surgically   Rectal: not done   Extremity:   extremities normal, atraumatic, no cyanosis or edema     IMPRESSION:    Jonathan Agudelo is doing well postoperatively. She has a diagnosis of stage IA, grade 1 endometrial cancer. Medical problems:     Patient Active Problem List   Diagnosis Code    [facility] C54.[facility]       PLAN:    The operative procedures and clinical results have been previously reviewed with the patient. Implications of diagnosis discussed at length. All questions answered. Based upon her favorable pathology, I did not recommend any additional therapy. I previously discussed with her the rationale for and schedule of routine surveillance. I am going to send her for a CT of the abdomen/pelvis to rule out a postoperative issue. I suspect the scan will be negative, but we need to be sure there isn't a problem. An electronic signature was used to sign this note.     Thomas Aguirre MD  12/29/2022

## 2023-01-05 ENCOUNTER — HOSPITAL ENCOUNTER (OUTPATIENT)
Dept: CT IMAGING | Age: 53
Discharge: HOME | End: 2023-01-05
Attending: OBSTETRICS & GYNECOLOGY
Payer: COMMERCIAL

## 2023-01-05 DIAGNOSIS — C54.1 ENDOMETRIAL CANCER (HCC): ICD-10-CM

## 2023-01-05 DIAGNOSIS — R10.30 LOWER ABDOMINAL PAIN: ICD-10-CM

## 2023-01-05 PROCEDURE — 74176 CT ABD & PELVIS W/O CONTRAST: CPT

## 2023-01-12 ENCOUNTER — VIRTUAL VISIT (OUTPATIENT)
Dept: GYNECOLOGY | Age: 53
End: 2023-01-12
Payer: COMMERCIAL

## 2023-01-12 DIAGNOSIS — C54.1 ENDOMETRIAL CANCER (HCC): Primary | ICD-10-CM

## 2023-01-12 DIAGNOSIS — R10.30 LOWER ABDOMINAL PAIN: ICD-10-CM

## 2023-01-12 PROCEDURE — 99442 PR PHYS/QHP TELEPHONE EVALUATION 11-20 MIN: CPT | Performed by: OBSTETRICS & GYNECOLOGY

## 2023-01-12 NOTE — PROGRESS NOTES
[address] [employer] [address], [address] [facility]  P [phone]  F [phone]    Postoperative Office Note  Patient ID:  Name: Yvette Calixto  MRM: [id number]  : 1970/52 y.o. Date: 2023    Diagnosis:     ICD-10-CM ICD-9-CM    1. Endometrial cancer (HCC)  C54.1 182.0       2. Lower abdominal pain  R10.30 789.09               Problem List:   Patient Active Problem List   Diagnosis Code    [facility] ([facility] C54.1       SUBJECTIVE:    Yvette Calixto is a 45 y.o. female who presents for followup postoperative care following robotic-assisted laparoscopic hysterectomy, bilateral salpingooophorectomy, sentinel node dissection on 22. Operative findings:  Normal appearing uterus, tubes, and ovaries. [place] nodes mapped bilaterally. No evidence of metastatic disease. The patient's pathology revealed: FINAL PATHOLOGIC DIAGNOSIS   1. Lymph node, left pelvic sentinel lymph node, sentinel lymph node excision:   One lymph node negative for metastatic carcinoma. 2. Uterus, bilateral fallopian tubes and ovaries, total hysterectomy bilateral salpingo-oophorectomy:   TUMOR   Tumor Site: Endometrium   Histologic Type: Endometrioid carcinoma, with focal mucinous metaplasia   Histologic Grade: FIGO grade 1   Tumor Size: 3.8 cm   Myometrial Invasion: Present   Depth of Myometrial Invasion: 6 mm   Myometrial Thickness: 1.6 mm   Percentage of Myometrial Invasion: 40%   Adenomyosis: Not identified   Uterine Serosa Involvement: Not identified   Cervical Stromal Involvement: Not identified   Other Tissue / Organ Involvement: Not applicable   Peritoneal Ascitic Fluid: Results pending   Lymphovascular Invasion: Not identified   LYMPH NODES   Lymph Node Status:  All lymph nodes negative for tumor cells   Total Number of Pelvic Nodes Examined: 2   Number of Pelvic [place] Nodes Examined: 2   Total Number of Para-aortic Nodes Examined: 0   PATHOLOGIC STAGE CLASSIFICATION (pTNM, AJCC 8th Edition)   Primary Tumor (pT): pT1aN0 (sn)   FIGO STAGE   FIGO Stage: IA   ADDITIONAL FINDINGS   Additional Findings:   Leiomyoma   Bilateral fallopian tubes: Negative for microscopic pathologic abnormality   Bilateral ovaries: Negative for microscopic pathologic abnormality   3. Lymph node, right node pelvic sentinel lymph node, sentinel lymph node excision:   One lymph negative for metastatic carcinoma. She was seen in the office on 12/1/22 for her postoperative visit and everything appeared normal or expected at that time. She called recently with continued pain and was concerned that she was still having discomfort this far out from surgery. I suggested that she come in for evaluation. Her exam was unremarkable. I suggested we get a CT to rule out any issues. Medications:     Current Outpatient Medications on File Prior to Visit   Medication Sig Dispense Refill    phenol throat spray (CHLORASEPTIC) 1.4 % spray Take 1 Spray by mouth as needed for Sore throat. fluticasone propionate (FLONASE ALLERGY RELIEF NA) Flonase Allergy Relief      LORATADINE, BULK, loratadine (bulk)      acetaminophen (TYLENOL) 500 mg tablet Take  by mouth every six (6) hours as needed. albuterol (PROVENTIL VENTOLIN) 2.5 mg /3 mL (0.083 %) nebu albuterol sulfate 2.5 mg/3 mL (0.083 %) solution for nebulization   TAKE 3 ML AS NEEDED INHALATION EVERY 4 TO 6 HOURS 30 DAYS      albuterol (PROVENTIL HFA, VENTOLIN HFA, PROAIR HFA) 90 mcg/actuation inhaler albuterol sulfate HFA 90 mcg/actuation aerosol inhaler      omeprazole (PRILOSEC) 40 mg capsule TAKE 1 CAPSULE BY MOUTH ONCE A DAY 1/2 HOUR BEFORE BREAKFAST      metoprolol tartrate (LOPRESSOR) 100 mg IR tablet two (2) times a day. aspirin delayed-release 81 mg tablet Take  by mouth daily. (Patient not taking: No sig reported)       No current facility-administered medications on file prior to visit. Allergies:      Allergies   Allergen Reactions    Other Food Hives    Food [employer] Derived] Anaphylaxis    Venom-Honey Bee Anaphylaxis    Venom-Wasp Protein Anaphylaxis    Doxycycline Hives and Nausea Only    Iodine Vertigo and Unknown (comments)    Mushroom Hives    Prednisone Vertigo    Penicillins Nausea and Vomiting     Patient screened for any delayed non-IgE-mediated reaction to PCN.         Patient notes the following:    No delayed non-IgE-mediated reaction to PCN              Past Medical History:   Diagnosis Date    Adverse effect of anesthesia     \"HR WENT UP AND HAD TROUBLE BREATHING AFTER SURGERY\"    Arthritis     Asthma     CAD (coronary artery disease)     Cancer ([facility] 2022    UTERINE    GERD (gastroesophageal reflux disease)     Hypertension      Past Surgical History:   Procedure Laterality Date    HX BREAST LUMPECTOMY      HX COLONOSCOPY      HX DILATION AND CURETTAGE  09/26/2022    HX ENDOSCOPY      HX GYN  2022    UTERINE BIOPSY    HX HEENT      TEETH PULLED UNDER ANESTHESIA    HX ORTHOPAEDIC      tumor in finger     Social History     Socioeconomic History    Marital status:      Spouse name: Not on file    Number of children: Not on file    Years of education: Not on file    Highest education level: Not on file   Occupational History    Not on file   Tobacco Use    Smoking status: Every Day     Packs/day: 1.00     Years: 40.00     Pack years: 40.00     Types: Cigarettes    Smokeless tobacco: Never   Vaping Use    Vaping Use: Former    Substances: Nicotine, Flavoring, \"HAD TO STOP DUE TO TINGLING IN MOUTH AND SOB\"   Substance and Sexual Activity    Alcohol use: Never    Drug use: Not Currently    Sexual activity: Not on file   Other Topics Concern    Not on file   Social History Narrative    Not on file     Social Determinants of Health     Financial Resource Strain: Not on file   Food Insecurity: Not on file   Transportation Needs: Not on file   Physical Activity: Not on file   Stress: Not on file   Social Connections: Not on file Intimate Partner Violence: Not on file   Housing Stability: Not on file       OBJECTIVE:    Vitals: There were no vitals filed for this visit. Physical Examination:     General:     Lungs:    Cardiac:    Abdomen:    Incision:    Pelvic:    Rectal:    Extremity:         CT of abdomen/pelvis (1/5/23)  LOWER THORAX: No significant abnormality in the incidentally imaged lower chest.  LIVER: No mass. BILIARY TREE: Gallbladder is within normal limits. CBD is not dilated. SPLEEN: within normal limits. PANCREAS: No focal abnormality. The head of the pancreas is atrophied and fatty  replaced. No ductal dilatation. ADRENALS: The right adrenal gland is normal. There is a 1.7 cm nodule in the  left adrenal gland that measures negative Hounsfield units. This is consistent  with an adenoma. Further evaluation is not necessary  KIDNEYS/URETERS: No calculus or hydronephrosis. STOMACH: Unremarkable. SMALL BOWEL: No dilatation or wall thickening. COLON: No dilatation or wall thickening. There are diverticula in the sigmoid  colon. No evidence for diverticulitis. APPENDIX: Normal  PERITONEUM: No ascites or pneumoperitoneum. RETROPERITONEUM: No lymphadenopathy or aortic aneurysm. REPRODUCTIVE ORGANS: Uterus has been removed as have the ovaries. No free fluid. No masses. No adenopathy. URINARY BLADDER: No mass or calculus. BONES: No destructive bone lesion. ABDOMINAL WALL: No mass or hernia. ADDITIONAL COMMENTS: N/A     IMPRESSION  1. Patient status post hysterectomy and oophorectomy. No free fluid or masses  are noted. 2. No evidence for diverticulitis. IMPRESSION:    Jonathon Allen is overall doing well postoperatively, aside from some continued pain. She has a diagnosis of stage IA, grade 1 endometrial cancer. Her CT is unremarkable and shows nothing that would be the cause of her pain.     Medical problems:     Patient Active Problem List   Diagnosis Code    [facility] C54.1       PLAN:    The operative procedures and clinical results have been previously reviewed with the patient. Implications of diagnosis discussed at length. All questions answered. Based upon her favorable pathology, I did not recommend any additional therapy. I previously discussed with her the rationale for and schedule of routine surveillance. She can follow-up in 3 months to begin surveillance of disease. Jeniffer Yost is a 45 y.o. female, evaluated via audio-only technology on 1/12/2023 for Results (Virtual telephone visit. Follow up to review ct scan results.)      Assessment & Plan:   Diagnoses and all orders for this visit:    1. Endometrial cancer ([facility]    2. Lower abdominal pain        Subjective:       Prior to Admission medications    Medication Sig Start Date End Date Taking? Authorizing Provider   phenol throat spray (CHLORASEPTIC) 1.4 % spray Take 1 Spray by mouth as needed for Sore throat. Yes Provider, Historical   fluticasone propionate (FLONASE ALLERGY RELIEF NA) Flonase Allergy Relief   Yes Provider, Historical   LORATADINE, BULK, loratadine (bulk)   Yes Provider, Historical   acetaminophen (TYLENOL) 500 mg tablet Take  by mouth every six (6) hours as needed. Yes Provider, Historical   albuterol (PROVENTIL VENTOLIN) 2.5 mg /3 mL (0.083 %) nebu albuterol sulfate 2.5 mg/3 mL (0.083 %) solution for nebulization   TAKE 3 ML AS NEEDED INHALATION EVERY 4 TO 6 HOURS 30 DAYS   Yes Provider, Historical   albuterol (PROVENTIL HFA, VENTOLIN HFA, PROAIR HFA) 90 mcg/actuation inhaler albuterol sulfate HFA 90 mcg/actuation aerosol inhaler   Yes Provider, Historical   omeprazole (PRILOSEC) 40 mg capsule TAKE 1 CAPSULE BY MOUTH ONCE A DAY 1/2 HOUR BEFORE BREAKFAST 8/2/22  Yes Provider, Historical   metoprolol tartrate (LOPRESSOR) 100 mg IR tablet two (2) times a day. 10/12/22  Yes Provider, Historical   aspirin delayed-release 81 mg tablet Take  by mouth daily.   Patient not taking: No sig reported    Provider, Historical     Patient Active Problem List   Diagnosis Code    Endometrial cancer [facility]) C54.1     Patient Active Problem List    Diagnosis Date Noted    Endometrial cancer ([facility] 11/02/2022     Current Outpatient Medications   Medication Sig Dispense Refill    phenol throat spray (CHLORASEPTIC) 1.4 % spray Take 1 Spray by mouth as needed for Sore throat. fluticasone propionate (FLONASE ALLERGY RELIEF NA) Flonase Allergy Relief      LORATADINE, BULK, loratadine (bulk)      acetaminophen (TYLENOL) 500 mg tablet Take  by mouth every six (6) hours as needed. albuterol (PROVENTIL VENTOLIN) 2.5 mg /3 mL (0.083 %) nebu albuterol sulfate 2.5 mg/3 mL (0.083 %) solution for nebulization   TAKE 3 ML AS NEEDED INHALATION EVERY 4 TO 6 HOURS 30 DAYS      albuterol (PROVENTIL HFA, VENTOLIN HFA, PROAIR HFA) 90 mcg/actuation inhaler albuterol sulfate HFA 90 mcg/actuation aerosol inhaler      omeprazole (PRILOSEC) 40 mg capsule TAKE 1 CAPSULE BY MOUTH ONCE A DAY 1/2 HOUR BEFORE BREAKFAST      metoprolol tartrate (LOPRESSOR) 100 mg IR tablet two (2) times a day. aspirin delayed-release 81 mg tablet Take  by mouth daily. (Patient not taking: No sig reported)       Allergies   Allergen Reactions    Other Food Hives    Food [Shellfish Derived] Anaphylaxis    Venom-Honey Bee Anaphylaxis    Venom-Wasp Protein Anaphylaxis    Doxycycline Hives and Nausea Only    Iodine Vertigo and Unknown (comments)    Mushroom Hives    Prednisone Vertigo    Penicillins Nausea and Vomiting     Patient screened for any delayed non-IgE-mediated reaction to PCN.         Patient notes the following:    No delayed non-IgE-mediated reaction to PCN              Past Medical History:   Diagnosis Date    Adverse effect of anesthesia     \"HR WENT UP AND HAD TROUBLE BREATHING AFTER SURGERY\"    Arthritis     Asthma     CAD (coronary artery disease)     Cancer ([facility] 2022    UTERINE    GERD (gastroesophageal reflux disease)     Hypertension      Past Surgical History:   Procedure Laterality Date    HX BREAST LUMPECTOMY      HX COLONOSCOPY      HX DILATION AND CURETTAGE  09/26/2022    HX ENDOSCOPY      HX GYN  2022    UTERINE BIOPSY    HX HEENT      TEETH PULLED UNDER ANESTHESIA    HX ORTHOPAEDIC      tumor in finger     Family History   Problem Relation Age of Onset    Lung Cancer Mother         UNSURE IF MOM HAD ANESTHIA PROBLEMS    Breast Cancer Mother     Hypertension Father     Heart Disease Father     No Known Problems Sister     Lung Cancer Paternal Grandfather      Social History     Tobacco Use    Smoking status: Every Day     Packs/day: 1.00     Years: 40.00     Pack years: 40.00     Types: Cigarettes    Smokeless tobacco: Never   Substance Use Topics    Alcohol use: Never       ROS    No flowsheet data found. Pilar Ball, who was evaluated through a patient-initiated, synchronous (real-time) audio only encounter, and/or her healthcare decision maker, is aware that it is a billable service, with coverage as determined by her insurance carrier. She provided verbal consent to proceed: Yes. She has not had a related appointment within my department in the past 7 days or scheduled within the next 24 hours. Total Time: minutes: 11-20 minutes      An electronic signature was used to sign this note.     Brandon Downs MD  1/12/2023

## 2023-02-28 ENCOUNTER — OFFICE VISIT (OUTPATIENT)
Dept: GYNECOLOGY | Age: 53
End: 2023-02-28
Payer: COMMERCIAL

## 2023-02-28 VITALS
SYSTOLIC BLOOD PRESSURE: 107 MMHG | HEART RATE: 64 BPM | HEIGHT: 64 IN | DIASTOLIC BLOOD PRESSURE: 68 MMHG | WEIGHT: 254 LBS | BODY MASS INDEX: 43.36 KG/M2

## 2023-02-28 DIAGNOSIS — C54.1 ENDOMETRIAL CANCER (HCC): Primary | ICD-10-CM

## 2023-02-28 PROCEDURE — 99213 OFFICE O/P EST LOW 20 MIN: CPT | Performed by: OBSTETRICS & GYNECOLOGY

## 2023-02-28 NOTE — PROGRESS NOTES
[address] [place] [address] [address], [address] [facility]  P [phone]  F [phone]    Office Note  Patient ID:  Name:  Mert Mckinnon  MRN:  [id number]  :  1970/53 y.o. Date:  2023      HISTORY OF PRESENT ILLNESS:  Mert Mckinnon is a 52 y.o.  postmenopausal female who is an established patient being seen for a diagnosis of stage IA, grade 1, endometrial cancer. I performed robotic-assisted laparoscopic hysterectomy, bilateral salpingooophorectomy, sentinel node dissection on 22. Based upon her favorable pathology, she was not recommended any adjuvant therapy. She presents today for surveillance. She is doing well and is without complaints. She denies any vaginal bleeding or discharge, any pelvic or abdominal pain, or any changes in her bowel or bladder habits. ROS:   and GI review:  Negative  Cardiopulmonary review:  Negative   Musculoskeletal:  Negative    A comprehensive review of systems was negative except for that written in the History of Present Illness. , 10 point ROS        Problem List:  Patient Active Problem List    Diagnosis Date Noted    Endometrial cancer ([facility] 2022     PMH:  Past Medical History:   Diagnosis Date    Adverse effect of anesthesia     \"HR WENT UP AND HAD TROUBLE BREATHING AFTER SURGERY\"    Arthritis     Asthma     CAD (coronary artery disease)     Cancer ([facility]     UTERINE    GERD (gastroesophageal reflux disease)     Hypertension       PSH:  Past Surgical History:   Procedure Laterality Date    HX BREAST LUMPECTOMY      HX COLONOSCOPY      HX DILATION AND CURETTAGE  2022    HX ENDOSCOPY      HX GYN      UTERINE BIOPSY    HX HEENT      TEETH PULLED UNDER ANESTHESIA    HX ORTHOPAEDIC      tumor in finger      Social History:  Social History     Tobacco Use    Smoking status: Every Day     Packs/day: 1.00     Years: 40.00     Pack years: 40.00     Types: Cigarettes    Smokeless tobacco: Never Substance Use Topics    Alcohol use: Never      Family History:  Family History   Problem Relation Age of Onset    Lung Cancer Mother         UNSURE IF MOM HAD ANESTHIA PROBLEMS    Breast Cancer Mother     Hypertension Father     Heart Disease Father     No Known Problems Sister     Lung Cancer Paternal Grandfather       Medications: (reviewed)  Current Outpatient Medications   Medication Sig    montelukast sodium (SINGULAIR PO) Take  by mouth.    budesonide/formoterol fumarate (SYMBICORT IN) Take  by inhalation. phenol throat spray (CHLORASEPTIC) 1.4 % spray Take 1 Spray by mouth as needed for Sore throat. fluticasone propionate (FLONASE ALLERGY RELIEF NA) Flonase Allergy Relief    LORATADINE, BULK, loratadine (bulk)    acetaminophen (TYLENOL) 500 mg tablet Take  by mouth every six (6) hours as needed. albuterol (PROVENTIL VENTOLIN) 2.5 mg /3 mL (0.083 %) nebu albuterol sulfate 2.5 mg/3 mL (0.083 %) solution for nebulization   TAKE 3 ML AS NEEDED INHALATION EVERY 4 TO 6 HOURS 30 DAYS    albuterol (PROVENTIL HFA, VENTOLIN HFA, PROAIR HFA) 90 mcg/actuation inhaler albuterol sulfate HFA 90 mcg/actuation aerosol inhaler    omeprazole (PRILOSEC) 40 mg capsule TAKE 1 CAPSULE BY MOUTH ONCE A DAY 1/2 HOUR BEFORE BREAKFAST    metoprolol tartrate (LOPRESSOR) 100 mg IR tablet two (2) times a day. aspirin delayed-release 81 mg tablet Take  by mouth daily. (Patient not taking: No sig reported)     No current facility-administered medications for this visit. Allergies: (reviewed)  Allergies   Allergen Reactions    Other Food Hives    Food [Shellfish Derived] Anaphylaxis    Venom-Honey Bee Anaphylaxis    Venom-Wasp Protein Anaphylaxis    Doxycycline Hives and Nausea Only    Iodine Vertigo and Unknown (comments)    Mushroom Hives    Prednisone Vertigo    Penicillins Nausea and Vomiting     Patient screened for any delayed non-IgE-mediated reaction to PCN.         Patient notes the following:    No delayed non-IgE-mediated reaction to PCN                   OBJECTIVE:    Physical Exam:  VITAL SIGNS: Vitals:    02/28/23 1339   BP: 107/68   Pulse: 64   Weight: 254 lb (115.2 kg)   Height: 5' 4.02\" (1.626 m)     Body mass index is 43.58 kg/m². GENERAL RODRIGO: Conversant, alert, oriented. No acute distress. HEENT: HEENT. No thyroid enlargement. No JVD. Neck: Supple without restrictions. RESPIRATORY: Clear to auscultation and percussion to the bases. No CVAT. CARDIOVASC: RRR without murmur/rub. GASTROINT: soft, non-tender, without masses or organomegaly   MUSCULOSKEL: no joint tenderness, deformity or swelling   EXTREMITIES: extremities normal, atraumatic, no cyanosis or edema   PELVIC: Normal external genitalia. Normal vagina. Uterus, cervix, and adnexa absent. No masses or nodularity. RECTAL: Deferred   NAVID SURVEY: No suspicious lymphadenopathy or edema noted. NEURO: Grossly intact. No acute deficit. Lab Data:    Lab Results   Component Value Date/Time    WBC 11.4 (H) 11/03/2022 03:30 AM    HGB 13.5 11/03/2022 03:30 AM    HCT 43.1 11/03/2022 03:30 AM    PLATELET 569 21/11/3582 03:30 AM    MCV 90.2 11/03/2022 03:30 AM     Lab Results   Component Value Date/Time    Sodium 137 11/03/2022 03:30 AM    Potassium 4.8 11/03/2022 03:30 AM    Chloride 107 11/03/2022 03:30 AM    CO2 25 11/03/2022 03:30 AM    Anion gap 5 11/03/2022 03:30 AM    Glucose 138 (H) 11/03/2022 03:30 AM    BUN 14 11/03/2022 03:30 AM    Creatinine 1.15 (H) 11/03/2022 03:30 AM    BUN/Creatinine ratio 12 11/03/2022 03:30 AM    Calcium 8.6 11/03/2022 03:30 AM         CT of abdomen/pelvis (1/5/23)  LOWER THORAX: No significant abnormality in the incidentally imaged lower chest.  LIVER: No mass. BILIARY TREE: Gallbladder is within normal limits. CBD is not dilated. SPLEEN: within normal limits. PANCREAS: No focal abnormality. The head of the pancreas is atrophied and fatty  replaced. No ductal dilatation.   ADRENALS: The right adrenal gland is normal. There is a 1.7 cm nodule in the  left adrenal gland that measures negative Hounsfield units. This is consistent  with an adenoma. Further evaluation is not necessary  KIDNEYS/URETERS: No calculus or hydronephrosis. STOMACH: Unremarkable. SMALL BOWEL: No dilatation or wall thickening. COLON: No dilatation or wall thickening. There are diverticula in the sigmoid  colon. No evidence for diverticulitis. APPENDIX: Normal  PERITONEUM: No ascites or pneumoperitoneum. RETROPERITONEUM: No lymphadenopathy or aortic aneurysm. REPRODUCTIVE ORGANS: Uterus has been removed as have the ovaries. No free fluid. No masses. No adenopathy. URINARY BLADDER: No mass or calculus. BONES: No destructive bone lesion. ABDOMINAL WALL: No mass or hernia. ADDITIONAL COMMENTS: N/A     IMPRESSION  1. Patient status post hysterectomy and oophorectomy. No free fluid or masses  are noted. 2. No evidence for diverticulitis. IMPRESSION/PLAN:  Jessica Ryder is a 52 y.o. female with a diagnosis of stage IA, grade 1, endometrial cancer. She has no evidence of disease on today's exam.  I will see her back in 3 months for continued surveillance. An electronic signature was used to sign this note.     Amelia Child MD  02/28/23

## 2023-05-30 ENCOUNTER — OFFICE VISIT (OUTPATIENT)
Age: 53
End: 2023-05-30
Payer: COMMERCIAL

## 2023-05-30 VITALS
SYSTOLIC BLOOD PRESSURE: 126 MMHG | DIASTOLIC BLOOD PRESSURE: 78 MMHG | HEART RATE: 60 BPM | WEIGHT: 237 LBS | BODY MASS INDEX: 40.66 KG/M2

## 2023-05-30 DIAGNOSIS — C54.1 ENDOMETRIAL CANCER (HCC): Primary | ICD-10-CM

## 2023-05-30 PROCEDURE — 99213 OFFICE O/P EST LOW 20 MIN: CPT | Performed by: OBSTETRICS & GYNECOLOGY

## 2023-05-30 RX ORDER — BUDESONIDE AND FORMOTEROL FUMARATE DIHYDRATE 160; 4.5 UG/1; UG/1
2 AEROSOL RESPIRATORY (INHALATION) 2 TIMES DAILY
COMMUNITY

## 2023-05-30 RX ORDER — MONTELUKAST SODIUM 10 MG/1
10 TABLET ORAL NIGHTLY
COMMUNITY

## 2023-05-30 NOTE — PROGRESS NOTES
Three month check up for history of endometrial cancer. Pt states no abnormal spotting, bleeding or pain. 1. Have you been to the ER, urgent care clinic since your last visit? Hospitalized since your last visit?no    2. Have you seen or consulted any other health care providers outside of the [address] [place] [address] since your last visit? Include any pap smears or colon screening.   no

## 2023-05-30 NOTE — PROGRESS NOTES
[address] [place] [address]  P [phone]  F [phone]    Office Note  Patient ID:  Name:  Bandar Cabrera  MRN:  [id number]  :  1970/53 y.o. Date:  2023      HISTORY OF PRESENT ILLNESS:  Bandar Cabrera is a 52 y.o.  postmenopausal female who is an established patient being seen for a diagnosis of stage IA, grade 1, endometrial cancer. I performed robotic-assisted laparoscopic hysterectomy, bilateral salpingooophorectomy, sentinel node dissection on 22. Based upon her favorable pathology, she was not recommended any adjuvant therapy. She presents today for surveillance. She is doing well and is without complaints. She denies any vaginal bleeding or discharge, any pelvic or abdominal pain, or any changes in her bowel or bladder habits. ROS:   and GI review:  Negative  Cardiopulmonary review:  Negative   Musculoskeletal:  Negative     A comprehensive review of systems was negative except for that written in the History of Present Illness. , 10 point ROS       Problem List:  Patient Active Problem List    Diagnosis Date Noted    Endometrial cancer ([facility] 2022     PMH:  Past Medical History:   Diagnosis Date    Adverse effect of anesthesia     \"HR WENT UP AND HAD TROUBLE BREATHING AFTER SURGERY\"    Arthritis     Asthma     CAD (coronary artery disease)     Cancer ([facility]     UTERINE    GERD (gastroesophageal reflux disease)     Hypertension       PSH:  Past Surgical History:   Procedure Laterality Date    BREAST LUMPECTOMY      COLONOSCOPY      DILATION AND CURETTAGE OF UTERUS  2022    GYN      UTERINE BIOPSY    HEENT      TEETH PULLED UNDER ANESTHESIA    ORTHOPEDIC SURGERY      tumor in finger    UPPER GASTROINTESTINAL ENDOSCOPY        Social History:  Social History     Tobacco Use    Smoking status: Every Day     Packs/day: 1.00     Types: Cigarettes    Smokeless tobacco: Never   Substance Use Topics

## 2023-08-31 ENCOUNTER — OFFICE VISIT (OUTPATIENT)
Age: 53
End: 2023-08-31
Payer: COMMERCIAL

## 2023-08-31 VITALS
WEIGHT: 229 LBS | HEIGHT: 64 IN | DIASTOLIC BLOOD PRESSURE: 72 MMHG | HEART RATE: 66 BPM | BODY MASS INDEX: 39.09 KG/M2 | SYSTOLIC BLOOD PRESSURE: 110 MMHG

## 2023-08-31 DIAGNOSIS — C54.1 ENDOMETRIAL CANCER (HCC): Primary | ICD-10-CM

## 2023-08-31 PROCEDURE — 99212 OFFICE O/P EST SF 10 MIN: CPT | Performed by: OBSTETRICS & GYNECOLOGY

## 2023-08-31 NOTE — PROGRESS NOTES
Three month check up for history of endometrial cancer. Pt states no abnormal spotting, bleeding or pain. Patient is having pain with IC and a small amount of spotting, not sure of location. 1. Have you been to the ER, urgent care clinic since your last visit? Hospitalized since your last visit?no    2. Have you seen or consulted any other health care providers outside of the [address] [place] [address] since your last visit? Include any pap smears or colon screening.  no
dilatation or wall thickening. There are diverticula in the sigmoid  colon. No evidence for diverticulitis. APPENDIX: Normal  PERITONEUM: No ascites or pneumoperitoneum. RETROPERITONEUM: No lymphadenopathy or aortic aneurysm. REPRODUCTIVE ORGANS: Uterus has been removed as have the ovaries. No free fluid. No masses. No adenopathy. URINARY BLADDER: No mass or calculus. BONES: No destructive bone lesion. ABDOMINAL WALL: No mass or hernia. ADDITIONAL COMMENTS: N/A     IMPRESSION  1. Patient status post hysterectomy and oophorectomy. No free fluid or masses  are noted. 2. No evidence for diverticulitis. IMPRESSION/PLAN:  Del Cardoza is a 52 y.o. female with a diagnosis of stage IA, grade 1, endometrial cancer. She has no evidence of disease on today's exam.  I will see her back in 3 months for continued surveillance. An electronic signature was used to sign this note.     Amos Recinos MD  08/31/23

## 2023-11-30 ENCOUNTER — OFFICE VISIT (OUTPATIENT)
Age: 53
End: 2023-11-30
Payer: COMMERCIAL

## 2023-11-30 VITALS
HEIGHT: 64 IN | BODY MASS INDEX: 39.78 KG/M2 | WEIGHT: 233 LBS | SYSTOLIC BLOOD PRESSURE: 125 MMHG | DIASTOLIC BLOOD PRESSURE: 78 MMHG | HEART RATE: 74 BPM

## 2023-11-30 DIAGNOSIS — C54.1 ENDOMETRIAL CANCER (HCC): Primary | ICD-10-CM

## 2023-11-30 PROCEDURE — 99212 OFFICE O/P EST SF 10 MIN: CPT | Performed by: OBSTETRICS & GYNECOLOGY

## 2023-11-30 NOTE — PROGRESS NOTES
[address] [address] [address]  P [phone]  F [phone]    Office Note  Patient ID:  Name:  Arvin Burgos  MRN:  [id number]  :  1970/53 y.o. Date:  2023      HISTORY OF PRESENT ILLNESS:  Arvin Burgos is a 52 y.o.  postmenopausal female who is an established patient being seen for a diagnosis of stage IA, grade 1, endometrial cancer. I performed robotic-assisted laparoscopic hysterectomy, bilateral salpingooophorectomy, sentinel node dissection on 22. Based upon her favorable pathology, she was not recommended any adjuvant therapy. She presents today for surveillance. She is doing well and is without complaints. She denies any vaginal bleeding or discharge, any pelvic or abdominal pain, or any changes in her bowel or bladder habits. ROS:   and GI review:  Negative  Cardiopulmonary review:  Negative   Musculoskeletal:  Negative     A comprehensive review of systems was negative except for that written in the History of Present Illness. , 10 point ROS       Problem List:  Patient Active Problem List    Diagnosis Date Noted    Endometrial cancer ([facility]) 2022     PMH:  Past Medical History:   Diagnosis Date    Adverse effect of anesthesia     \"HR WENT UP AND HAD TROUBLE BREATHING AFTER SURGERY\"    Arthritis     Asthma     CAD (coronary artery disease)     Cancer ([facility])     UTERINE    GERD (gastroesophageal reflux disease)     Hypertension       PSH:  Past Surgical History:   Procedure Laterality Date    BREAST LUMPECTOMY      COLONOSCOPY      DILATION AND CURETTAGE OF UTERUS  2022    GYN      UTERINE BIOPSY    HEENT      TEETH PULLED UNDER ANESTHESIA    ORTHOPEDIC SURGERY      tumor in finger    UPPER GASTROINTESTINAL ENDOSCOPY        Social History:  Social History     Tobacco Use    Smoking status: Every Day     Packs/day: 1     Types: Cigarettes    Smokeless tobacco: Never   Substance Use Topics

## 2024-02-29 ENCOUNTER — OFFICE VISIT (OUTPATIENT)
Age: 54
End: 2024-02-29
Payer: COMMERCIAL

## 2024-02-29 VITALS
HEIGHT: 64 IN | DIASTOLIC BLOOD PRESSURE: 69 MMHG | WEIGHT: 236 LBS | HEART RATE: 56 BPM | SYSTOLIC BLOOD PRESSURE: 121 MMHG | BODY MASS INDEX: 40.29 KG/M2

## 2024-02-29 DIAGNOSIS — C54.1 ENDOMETRIAL CANCER (HCC): Primary | ICD-10-CM

## 2024-02-29 PROCEDURE — 99212 OFFICE O/P EST SF 10 MIN: CPT | Performed by: OBSTETRICS & GYNECOLOGY

## 2024-02-29 RX ORDER — FLUTICASONE FUROATE AND VILANTEROL TRIFENATATE 100; 25 UG/1; UG/1
POWDER RESPIRATORY (INHALATION)
COMMUNITY

## 2024-02-29 NOTE — PROGRESS NOTES
Three month check up. Pt states no abnormal spotting, bleeding or pain.  1. Have you been to the ER, urgent care clinic since your last visit?  Hospitalized since your last visit?no    2. Have you seen or consulted any other health care providers outside of the [facility] since your last visit?  Include any pap smears or colon screening. no    
non-IgE-mediated reaction to PCN.        Patient notes the following:    No delayed non-IgE-mediated reaction to PCN          OBJECTIVE:    Physical Exam:  VITAL SIGNS: Vitals:    02/29/24 1059   BP: 121/69   Site: Right Upper Arm   Position: Sitting   Pulse: 56   Weight: 107 kg (236 lb)   Height: 1.626 m (5' 4.02\")       Body mass index is 40.49 kg/m².   GENERAL OTONIEL: Conversant, alert, oriented. No acute distress.   HEENT: HEENT. No thyroid enlargement. No JVD.   Neck: Supple without restrictions.   RESPIRATORY: Clear to auscultation and percussion to the bases. No CVAT.   CARDIOVASC: RRR without murmur/rub.   GASTROINT: soft, non-tender, without masses or organomegaly   MUSCULOSKEL: no joint tenderness, deformity or swelling   EXTREMITIES: extremities normal, atraumatic, no cyanosis or edema   PELVIC: Normal external genitalia.  Normal vagina.  Uterus, cervix, and adnexa absent.     RECTAL: Deferred   EMILY SURVEY: No suspicious lymphadenopathy or edema noted.   NEURO: Grossly intact. No acute deficit.       Lab Data:    Lab Results   Component Value Date/Time    WBC 11.4 11/03/2022 03:30 AM    HGB 13.5 11/03/2022 03:30 AM    HCT 43.1 11/03/2022 03:30 AM     11/03/2022 03:30 AM    MCV 90.2 11/03/2022 03:30 AM     Lab Results   Component Value Date/Time     11/03/2022 03:30 AM    K 4.8 11/03/2022 03:30 AM     11/03/2022 03:30 AM    CO2 25 11/03/2022 03:30 AM    BUN 14 11/03/2022 03:30 AM         CT of abdomen/pelvis (1/5/23)  LOWER THORAX: No significant abnormality in the incidentally imaged lower chest.  LIVER: No mass.  BILIARY TREE: Gallbladder is within normal limits. CBD is not dilated.  SPLEEN: within normal limits.  PANCREAS: No focal abnormality. The head of the pancreas is atrophied and fatty  replaced. No ductal dilatation.  ADRENALS: The right adrenal gland is normal. There is a 1.7 cm nodule in the  left adrenal gland that measures negative Hounsfield units. This is consistent  with

## 2024-06-20 ENCOUNTER — OFFICE VISIT (OUTPATIENT)
Age: 54
End: 2024-06-20
Payer: COMMERCIAL

## 2024-06-20 VITALS — BODY MASS INDEX: 40.97 KG/M2 | WEIGHT: 240 LBS | HEIGHT: 64 IN

## 2024-06-20 DIAGNOSIS — C54.1 ENDOMETRIAL CANCER (HCC): Primary | ICD-10-CM

## 2024-06-20 PROCEDURE — 99212 OFFICE O/P EST SF 10 MIN: CPT | Performed by: OBSTETRICS & GYNECOLOGY

## 2024-06-20 RX ORDER — BUDESONIDE, GLYCOPYRROLATE, AND FORMOTEROL FUMARATE 160; 9; 4.8 UG/1; UG/1; UG/1
AEROSOL, METERED RESPIRATORY (INHALATION)
COMMUNITY

## 2024-06-20 NOTE — PROGRESS NOTES
Three month check up for history of endometrial cancer. Pt c/o pain with IC and bleeding after IC x1. Patient denies vaginal dryness.   1. Have you been to the ER, urgent care clinic since your last visit?  Hospitalized since your last visit?Yes, COPD and severe Asthma.     2. Have you seen or consulted any other health care providers outside of the [facility] since your last visit?  Include any pap smears or colon screening. no    
Doxycycline Hives and Nausea Only    Iodine Dizziness or Vertigo     Other reaction(s): Unknown (comments)    Mushroom Extract Complex Hives    Other Hives    Prednisone Dizziness or Vertigo    Penicillins Nausea And Vomiting     Patient screened for any delayed non-IgE-mediated reaction to PCN.        Patient notes the following:    No delayed non-IgE-mediated reaction to PCN          OBJECTIVE:    Physical Exam:  VITAL SIGNS: Vitals:    06/20/24 1023   Weight: 108.9 kg (240 lb)   Height: 1.626 m (5' 4.02\")       Body mass index is 41.18 kg/m².   GENERAL OTONIEL: Conversant, alert, oriented. No acute distress.   HEENT: HEENT. No thyroid enlargement. No JVD.   Neck: Supple without restrictions.   RESPIRATORY: Clear to auscultation and percussion to the bases. No CVAT.   CARDIOVASC: RRR without murmur/rub.   GASTROINT: soft, non-tender, without masses or organomegaly   MUSCULOSKEL: no joint tenderness, deformity or swelling   EXTREMITIES: extremities normal, atraumatic, no cyanosis or edema   PELVIC: Normal external genitalia.  Normal vagina.  Uterus, cervix, and adnexa absent.     RECTAL: Deferred   EMILY SURVEY: No suspicious lymphadenopathy or edema noted.   NEURO: Grossly intact. No acute deficit.       Lab Data:    Lab Results   Component Value Date/Time    WBC 11.4 11/03/2022 03:30 AM    HGB 13.5 11/03/2022 03:30 AM    HCT 43.1 11/03/2022 03:30 AM     11/03/2022 03:30 AM    MCV 90.2 11/03/2022 03:30 AM     Lab Results   Component Value Date/Time     11/03/2022 03:30 AM    K 4.8 11/03/2022 03:30 AM     11/03/2022 03:30 AM    CO2 25 11/03/2022 03:30 AM    BUN 14 11/03/2022 03:30 AM         CT of abdomen/pelvis (1/5/23)  LOWER THORAX: No significant abnormality in the incidentally imaged lower chest.  LIVER: No mass.  BILIARY TREE: Gallbladder is within normal limits. CBD is not dilated.  SPLEEN: within normal limits.  PANCREAS: No focal abnormality. The head of the pancreas is atrophied and

## 2024-11-20 NOTE — PROGRESS NOTES
Check up. Pt states no abnormal spotting, bleeding or pain. Pt c/o external irritation and bleeding in that area.  1. Have you been to the ER, urgent care clinic since your last visit?  Hospitalized since your last visit?no    2. Have you seen or consulted any other health care providers outside of the [facility] since your last visit?  Include any pap smears or colon screening. no

## 2024-11-21 ENCOUNTER — OFFICE VISIT (OUTPATIENT)
Age: 54
End: 2024-11-21
Payer: COMMERCIAL

## 2024-11-21 ENCOUNTER — SOCIAL WORK (OUTPATIENT)
Age: 54
End: 2024-11-21

## 2024-11-21 VITALS
SYSTOLIC BLOOD PRESSURE: 104 MMHG | HEART RATE: 68 BPM | HEIGHT: 64 IN | DIASTOLIC BLOOD PRESSURE: 67 MMHG | BODY MASS INDEX: 39.85 KG/M2 | WEIGHT: 233.4 LBS

## 2024-11-21 DIAGNOSIS — C54.1 ENDOMETRIAL CANCER (HCC): Primary | ICD-10-CM

## 2024-11-21 PROCEDURE — 99212 OFFICE O/P EST SF 10 MIN: CPT | Performed by: OBSTETRICS & GYNECOLOGY

## 2024-11-21 RX ORDER — CLOBETASOL PROPIONATE 0.5 MG/G
OINTMENT TOPICAL
Qty: 60 G | Refills: 5 | Status: SHIPPED | OUTPATIENT
Start: 2024-11-21

## 2024-11-21 RX ORDER — FLUTICASONE PROPIONATE 50 MCG
1 SPRAY, SUSPENSION (ML) NASAL DAILY PRN
COMMUNITY

## 2024-11-21 RX ORDER — LOPERAMIDE HYDROCHLORIDE 2 MG/1
2 CAPSULE ORAL 4 TIMES DAILY PRN
COMMUNITY

## 2024-11-21 ASSESSMENT — PATIENT HEALTH QUESTIONNAIRE - PHQ9
SUM OF ALL RESPONSES TO PHQ QUESTIONS 1-9: 2
6. FEELING BAD ABOUT YOURSELF - OR THAT YOU ARE A FAILURE OR HAVE LET YOURSELF OR YOUR FAMILY DOWN: NOT AT ALL
5. POOR APPETITE OR OVEREATING: NOT AT ALL
SUM OF ALL RESPONSES TO PHQ QUESTIONS 1-9: 2
7. TROUBLE CONCENTRATING ON THINGS, SUCH AS READING THE NEWSPAPER OR WATCHING TELEVISION: NEARLY EVERY DAY
1. LITTLE INTEREST OR PLEASURE IN DOING THINGS: MORE THAN HALF THE DAYS
8. MOVING OR SPEAKING SO SLOWLY THAT OTHER PEOPLE COULD HAVE NOTICED. OR THE OPPOSITE, BEING SO FIGETY OR RESTLESS THAT YOU HAVE BEEN MOVING AROUND A LOT MORE THAN USUAL: NOT AT ALL
10. IF YOU CHECKED OFF ANY PROBLEMS, HOW DIFFICULT HAVE THESE PROBLEMS MADE IT FOR YOU TO DO YOUR WORK, TAKE CARE OF THINGS AT HOME, OR GET ALONG WITH OTHER PEOPLE: VERY DIFFICULT
2. FEELING DOWN, DEPRESSED OR HOPELESS: NEARLY EVERY DAY
3. TROUBLE FALLING OR STAYING ASLEEP: NEARLY EVERY DAY
SUM OF ALL RESPONSES TO PHQ QUESTIONS 1-9: 2
2. FEELING DOWN, DEPRESSED OR HOPELESS: SEVERAL DAYS
SUM OF ALL RESPONSES TO PHQ9 QUESTIONS 1 & 2: 5
SUM OF ALL RESPONSES TO PHQ9 QUESTIONS 1 & 2: 2
SUM OF ALL RESPONSES TO PHQ QUESTIONS 1-9: 14
4. FEELING TIRED OR HAVING LITTLE ENERGY: NEARLY EVERY DAY
SUM OF ALL RESPONSES TO PHQ QUESTIONS 1-9: 2
9. THOUGHTS THAT YOU WOULD BE BETTER OFF DEAD, OR OF HURTING YOURSELF: NOT AT ALL
SUM OF ALL RESPONSES TO PHQ QUESTIONS 1-9: 14
1. LITTLE INTEREST OR PLEASURE IN DOING THINGS: SEVERAL DAYS

## 2024-11-21 NOTE — PROGRESS NOTES
[facility]  [address] [place] [address], [facility]  [place] [address]  P [phone]  F [phone]    Office Note  Patient ID:  Name:  Adriana Foley  MRN:  [id number]  :  1970/54 y.o.  Date:  2024      HISTORY OF PRESENT ILLNESS:  Adriana Foley is a 54 y.o.  postmenopausal female who is an established patient being seen for a diagnosis of stage IA, grade 1, endometrial cancer.   I performed robotic-assisted laparoscopic hysterectomy, bilateral salpingooophorectomy, sentinel node dissection on 22.  Based upon her favorable pathology, she was not recommended any adjuvant therapy.     She presents today for surveillance.  She is doing well and is without complaints.  She denies any pelvic or abdominal pain or any changes in her bowel or bladder habits.  She does report some spotting recently after intercourse, but none since.        ROS:   and GI review:  Negative  Cardiopulmonary review:  Negative   Musculoskeletal:  Negative     A comprehensive review of systems was negative except for that written in the History of Present Illness., 10 point ROS       Problem List:  Patient Active Problem List    Diagnosis Date Noted    Endometrial cancer (HCC) 2022     PMH:  Past Medical History:   Diagnosis Date    Adverse effect of anesthesia     \"HR WENT UP AND HAD TROUBLE BREATHING AFTER SURGERY\"    Arthritis     Asthma     CAD (coronary artery disease)     Cancer (HCC)     UTERINE    COPD (chronic obstructive pulmonary disease) (HCC)     GERD (gastroesophageal reflux disease)     Hypertension       PSH:  Past Surgical History:   Procedure Laterality Date    BREAST LUMPECTOMY      COLONOSCOPY      DILATION AND CURETTAGE OF UTERUS  2022    GYN      UTERINE BIOPSY    HEENT      TEETH PULLED UNDER ANESTHESIA    ORTHOPEDIC SURGERY      tumor in finger    UPPER GASTROINTESTINAL ENDOSCOPY        Social History:  Social History     Tobacco Use    Smoking status: Every Day

## 2024-11-21 NOTE — PROGRESS NOTES
N[facility]N Distress Thermometer    [facility] Gynecologic Oncology    Date Screening Completed: 11/21/24    Screening Declined:  [] Yes    Number that best describes how much distress you've experienced in the past week, including today?  0 [] - No distress 1 []      2 []      3 []      4 []       5 []       6 [x]      7 []      8 []      9 []       10 [] - Extreme distress    PROBLEM LIST  Have you had concerns about any of the items below in the past week, including today?      Physical Concerns Practical Concerns   [x] Pain [] Taking care of myself    [x] Sleep [] Taking care of others    [x] Fatigue [] Work   [x] Tobacco use  [] School   [] Substance use  [] Housing   [] Memory or concentration [x] Finances   [] Sexual health [] Insurance   [] Changes in eating  [] Transportation   [x] Loss or change of physical abilities  []     [] Having enough food   Emotional Concerns [] Access to medicine   [x] Worry or anxiety [] Treatment decisions   [] Sadness or depression    [] Loss of interest or enjoyment  Spiritual or Worship Concerns   [] Grief or loss  [] Sense of meaning or purpose   [] Fear [] Changes in marie or beliefs   [] Loneliness  [] Death, dying, or afterlife   [] Anger [] Conflict between beliefs and cancer treatments    [] Changes in appearance [] Relationship with the sacred   [] Feelings of worthlessness or being a burden [] Ritual or dietary needs        Social Concerns     [] Relationship with spouse or partner     [] Relationship with children    [] Relationship with family members     [] Relationship with friends or coworkers     [] Communication with health care team     [] Ability to have children     [] Prejudice or discrimination        Other Concerns: n/a    Patient received resource information and education:  [x] Yes  [] No        11/21/2024    12:20 PM   PHQ-9    Little interest or pleasure in doing things 2   Feeling down, depressed, or hopeless 3   Trouble falling or staying

## 2025-05-22 ENCOUNTER — OFFICE VISIT (OUTPATIENT)
Age: 55
End: 2025-05-22
Payer: COMMERCIAL

## 2025-05-22 VITALS — HEIGHT: 64 IN | BODY MASS INDEX: 40.04 KG/M2 | SYSTOLIC BLOOD PRESSURE: 138 MMHG | DIASTOLIC BLOOD PRESSURE: 83 MMHG

## 2025-05-22 DIAGNOSIS — C54.1 ENDOMETRIAL CANCER (HCC): Primary | ICD-10-CM

## 2025-05-22 PROCEDURE — 99212 OFFICE O/P EST SF 10 MIN: CPT | Performed by: OBSTETRICS & GYNECOLOGY

## 2025-05-22 NOTE — PROGRESS NOTES
Six month check up for history of endometrial cancer. Pt states no abnormal spotting, bleeding or pain.   1. Have you been to the ER, urgent care clinic since your last visit?  Hospitalized since your last visit?no  2. Have you seen or consulted any other health care providers outside of the [facility] since your last visit?  Include any pap smears or colon screening. no  
14 11/03/2022 03:30 AM         CT of abdomen/pelvis (1/5/23)  LOWER THORAX: No significant abnormality in the incidentally imaged lower chest.  LIVER: No mass.  BILIARY TREE: Gallbladder is within normal limits. CBD is not dilated.  SPLEEN: within normal limits.  PANCREAS: No focal abnormality. The head of the pancreas is atrophied and fatty  replaced. No ductal dilatation.  ADRENALS: The right adrenal gland is normal. There is a 1.7 cm nodule in the  left adrenal gland that measures negative Hounsfield units. This is consistent  with an adenoma. Further evaluation is not necessary  KIDNEYS/URETERS: No calculus or hydronephrosis.  STOMACH: Unremarkable.  SMALL BOWEL: No dilatation or wall thickening.  COLON: No dilatation or wall thickening. There are diverticula in the sigmoid  colon. No evidence for diverticulitis.  APPENDIX: Normal  PERITONEUM: No ascites or pneumoperitoneum.  RETROPERITONEUM: No lymphadenopathy or aortic aneurysm.  REPRODUCTIVE ORGANS: Uterus has been removed as have the ovaries. No free fluid.  No masses. No adenopathy.  URINARY BLADDER: No mass or calculus.  BONES: No destructive bone lesion.  ABDOMINAL WALL: No mass or hernia.  ADDITIONAL COMMENTS: N/A     IMPRESSION  1. Patient status post hysterectomy and oophorectomy. No free fluid or masses  are noted.  2. No evidence for diverticulitis.      IMPRESSION/PLAN:  Adriana Foley is a 55 y.o. female with a diagnosis of stage IA, grade 1, endometrial cancer.  She has no evidence of disease on today's exam.  I will see her back in 6 months for continued surveillance.       An electronic signature was used to sign this note.    Dick Bentley MD  05/22/25

## (undated) DEVICE — VCARE MEDIUM, UTERINE MANIPULATOR, VAGINAL-CERVICAL-AHLUWALIA'S-RETRACTOR-ELEVATOR: Brand: VCARE

## (undated) DEVICE — TRI-LUMEN FILTERED TUBE SET WITH ACTIVATED CHARCOAL FILTER: Brand: AIRSEAL

## (undated) DEVICE — AIRSEAL 8 MM ACCESS PORT AND LOW PROFILE OBTURATOR WITH BLADELESS OPTICAL TIP, 120 MM LENGTH: Brand: AIRSEAL

## (undated) DEVICE — SOLUTION IRRIG 1000ML 0.9% SOD CHL USP POUR PLAS BTL

## (undated) DEVICE — SUTURE ABSORBABLE MONOFILAMENT 2-0 8 IN ANTIBACT STRATAFIX SXMP1B409

## (undated) DEVICE — PAD PT POS 36 IN SURGYPAD DISP

## (undated) DEVICE — GLOVE SURG SZ 7 L12IN FNGR THK79MIL GRN LTX FREE

## (undated) DEVICE — INSUFFLATION NEEDLE: Brand: SURGINEEDLE

## (undated) DEVICE — PACK,BASIC,SIRUS,V: Brand: MEDLINE

## (undated) DEVICE — GLOVE SURG SZ 75 L12IN FNGR THK79MIL GRN LTX FREE

## (undated) DEVICE — TAPE,CLOTH/SILK,CURAD,3"X10YD,LF,40/CS: Brand: CURAD

## (undated) DEVICE — SEAL UNIV 5-8MM DISP BX/10 -- DA VINCI XI - SNGL USE

## (undated) DEVICE — OBTRTR BLDELSS OPT 8MM DISP -- DA VINICI XI - SNGL USE

## (undated) DEVICE — Device

## (undated) DEVICE — SYSTEM EVAC SMOKE LAPARSCOPIC

## (undated) DEVICE — COVER MPLR TIP CRV SCIS ACC DA VINCI

## (undated) DEVICE — SUT MCRYL 4-0 27IN PS2 UD --

## (undated) DEVICE — GLOVE SURG 7.5 11.7IN BEAD CUF LIGHT BRN SENSICARE LTX FREE

## (undated) DEVICE — ELECTRO LUBE IS A SINGLE PATIENT USE DEVICE THAT IS INTENDED TO BE USED ON ELECTROSURGICAL ELECTRODES TO REDUCE STICKING.: Brand: KEY SURGICAL ELECTRO LUBE

## (undated) DEVICE — GYN LAPAROSCOPY - SMH: Brand: MEDLINE INDUSTRIES, INC.

## (undated) DEVICE — GLOVE SURG SZ 8 L12IN FNGR THK94MIL STD WHT LTX FREE

## (undated) DEVICE — ARM DRAPE

## (undated) DEVICE — NDL SPNE QNCKE 22GX3.5IN LF --